# Patient Record
Sex: FEMALE | Race: ASIAN | NOT HISPANIC OR LATINO | Employment: FULL TIME | URBAN - METROPOLITAN AREA
[De-identification: names, ages, dates, MRNs, and addresses within clinical notes are randomized per-mention and may not be internally consistent; named-entity substitution may affect disease eponyms.]

---

## 2017-01-06 ENCOUNTER — OFFICE VISIT - HEALTHEAST (OUTPATIENT)
Dept: FAMILY MEDICINE | Facility: CLINIC | Age: 27
End: 2017-01-06

## 2017-01-06 DIAGNOSIS — Z63.72 SPOUSE OF ALCOHOLIC: ICD-10-CM

## 2017-01-06 DIAGNOSIS — Z30.017 NEXPLANON INSERTION: ICD-10-CM

## 2017-01-06 ASSESSMENT — MIFFLIN-ST. JEOR: SCORE: 1301.03

## 2017-07-26 ENCOUNTER — COMMUNICATION - HEALTHEAST (OUTPATIENT)
Dept: SCHEDULING | Facility: CLINIC | Age: 27
End: 2017-07-26

## 2017-07-26 ENCOUNTER — COMMUNICATION - HEALTHEAST (OUTPATIENT)
Dept: FAMILY MEDICINE | Facility: CLINIC | Age: 27
End: 2017-07-26

## 2017-07-27 ENCOUNTER — OFFICE VISIT - HEALTHEAST (OUTPATIENT)
Dept: FAMILY MEDICINE | Facility: CLINIC | Age: 27
End: 2017-07-27

## 2017-07-27 DIAGNOSIS — B37.31 YEAST VAGINITIS: ICD-10-CM

## 2017-07-27 DIAGNOSIS — R30.0 DYSURIA: ICD-10-CM

## 2017-07-27 DIAGNOSIS — R21 SKIN RASH: ICD-10-CM

## 2017-07-27 DIAGNOSIS — N30.90 CYSTITIS: ICD-10-CM

## 2017-08-23 ENCOUNTER — AMBULATORY - HEALTHEAST (OUTPATIENT)
Dept: NURSING | Facility: CLINIC | Age: 27
End: 2017-08-23

## 2017-12-11 ENCOUNTER — OFFICE VISIT - HEALTHEAST (OUTPATIENT)
Dept: FAMILY MEDICINE | Facility: CLINIC | Age: 27
End: 2017-12-11

## 2017-12-11 DIAGNOSIS — Z23 NEED FOR INFLUENZA VACCINATION: ICD-10-CM

## 2017-12-11 DIAGNOSIS — Z11.1 TUBERCULOSIS SCREENING: ICD-10-CM

## 2017-12-11 DIAGNOSIS — D64.9 ANEMIA: ICD-10-CM

## 2017-12-11 ASSESSMENT — MIFFLIN-ST. JEOR: SCORE: 1286.28

## 2017-12-13 ENCOUNTER — COMMUNICATION - HEALTHEAST (OUTPATIENT)
Dept: FAMILY MEDICINE | Facility: CLINIC | Age: 27
End: 2017-12-13

## 2020-01-22 ENCOUNTER — OFFICE VISIT - HEALTHEAST (OUTPATIENT)
Dept: FAMILY MEDICINE | Facility: CLINIC | Age: 30
End: 2020-01-22

## 2020-01-22 DIAGNOSIS — Z23 NEED FOR IMMUNIZATION AGAINST INFLUENZA: ICD-10-CM

## 2020-01-22 DIAGNOSIS — Z30.46 NEXPLANON REMOVAL: ICD-10-CM

## 2020-01-22 DIAGNOSIS — Z30.017 NEXPLANON INSERTION: ICD-10-CM

## 2020-01-22 LAB — HCG UR QL: NEGATIVE

## 2020-01-22 ASSESSMENT — MIFFLIN-ST. JEOR: SCORE: 1282.88

## 2020-11-05 ENCOUNTER — OFFICE VISIT - HEALTHEAST (OUTPATIENT)
Dept: FAMILY MEDICINE | Facility: CLINIC | Age: 30
End: 2020-11-05

## 2020-11-05 ENCOUNTER — COMMUNICATION - HEALTHEAST (OUTPATIENT)
Dept: FAMILY MEDICINE | Facility: CLINIC | Age: 30
End: 2020-11-05

## 2020-11-05 DIAGNOSIS — Z30.016 ENCOUNTER FOR INITIAL PRESCRIPTION OF TRANSDERMAL PATCH HORMONAL CONTRACEPTIVE DEVICE: ICD-10-CM

## 2020-11-05 DIAGNOSIS — Z31.69 ENCOUNTER FOR PRECONCEPTION CONSULTATION: ICD-10-CM

## 2020-11-05 DIAGNOSIS — Z30.46 NEXPLANON REMOVAL: ICD-10-CM

## 2020-11-05 ASSESSMENT — MIFFLIN-ST. JEOR: SCORE: 1325.12

## 2021-01-12 ENCOUNTER — AMBULATORY - HEALTHEAST (OUTPATIENT)
Dept: OTHER | Facility: CLINIC | Age: 31
End: 2021-01-12

## 2021-03-29 ENCOUNTER — OFFICE VISIT - HEALTHEAST (OUTPATIENT)
Dept: FAMILY MEDICINE | Facility: CLINIC | Age: 31
End: 2021-03-29

## 2021-03-29 DIAGNOSIS — Z11.1 SCREENING EXAMINATION FOR PULMONARY TUBERCULOSIS: ICD-10-CM

## 2021-03-29 DIAGNOSIS — Z30.9 CONTRACEPTIVE MANAGEMENT: ICD-10-CM

## 2021-03-29 LAB — HCG UR QL: NEGATIVE

## 2021-03-29 ASSESSMENT — MIFFLIN-ST. JEOR: SCORE: 1346.38

## 2021-03-31 LAB
GAMMA INTERFERON BACKGROUND BLD IA-ACNC: 0.08 IU/ML
M TB IFN-G BLD-IMP: NEGATIVE
MITOGEN IGNF BCKGRD COR BLD-ACNC: -0.01 IU/ML
MITOGEN IGNF BCKGRD COR BLD-ACNC: 0 IU/ML
QTF INTERPRETATION: NORMAL
QTF MITOGEN - NIL: 3.43 IU/ML

## 2021-04-20 ENCOUNTER — COMMUNICATION - HEALTHEAST (OUTPATIENT)
Dept: FAMILY MEDICINE | Facility: CLINIC | Age: 31
End: 2021-04-20

## 2021-04-29 ENCOUNTER — RECORDS - HEALTHEAST (OUTPATIENT)
Dept: ADMINISTRATIVE | Facility: OTHER | Age: 31
End: 2021-04-29

## 2021-04-29 ENCOUNTER — AMBULATORY - HEALTHEAST (OUTPATIENT)
Dept: FAMILY MEDICINE | Facility: CLINIC | Age: 31
End: 2021-04-29

## 2021-04-29 DIAGNOSIS — Z30.017 ENCOUNTER FOR INITIAL PRESCRIPTION OF IMPLANTABLE SUBDERMAL CONTRACEPTIVE: ICD-10-CM

## 2021-04-29 DIAGNOSIS — Z12.4 SCREENING FOR CERVICAL CANCER: ICD-10-CM

## 2021-04-29 DIAGNOSIS — Z11.3 SCREEN FOR STD (SEXUALLY TRANSMITTED DISEASE): ICD-10-CM

## 2021-04-29 LAB — HCG UR QL: NEGATIVE

## 2021-04-29 RX ORDER — ETONOGESTREL 68 MG/1
1 IMPLANT SUBCUTANEOUS ONCE
Status: ON HOLD | COMMUNITY
Start: 2021-04-29 | End: 2022-01-25

## 2021-04-29 RX ORDER — ACETAMINOPHEN 325 MG/1
325 TABLET ORAL EVERY 6 HOURS PRN
Qty: 120 TABLET | Refills: 12 | Status: ON HOLD | OUTPATIENT
Start: 2021-04-29 | End: 2022-01-25

## 2021-04-30 LAB
C TRACH DNA SPEC QL PROBE+SIG AMP: NEGATIVE
CLUE CELLS: NORMAL
HPV SOURCE: NORMAL
HUMAN PAPILLOMA VIRUS 16 DNA: NEGATIVE
HUMAN PAPILLOMA VIRUS 18 DNA: NEGATIVE
HUMAN PAPILLOMA VIRUS FINAL DIAGNOSIS: NORMAL
HUMAN PAPILLOMA VIRUS OTHER HR: NEGATIVE
N GONORRHOEA DNA SPEC QL NAA+PROBE: NEGATIVE
SPECIMEN DESCRIPTION: NORMAL
TRICHOMONAS, WET PREP: NORMAL
YEAST, WET PREP: NORMAL

## 2021-05-04 ENCOUNTER — COMMUNICATION - HEALTHEAST (OUTPATIENT)
Dept: FAMILY MEDICINE | Facility: CLINIC | Age: 31
End: 2021-05-04

## 2021-05-06 LAB
BKR LAB AP ABNORMAL BLEEDING: NO
BKR LAB AP BIRTH CONTROL/HORMONES: NORMAL
BKR LAB AP CERVICAL APPEARANCE: NORMAL
BKR LAB AP GYN ADEQUACY: NORMAL
BKR LAB AP GYN INTERPRETATION: NORMAL
BKR LAB AP HPV REFLEX: NORMAL
BKR LAB AP LMP: NORMAL
BKR LAB AP PATIENT STATUS: NORMAL
BKR LAB AP PREVIOUS ABNORMAL: NORMAL
BKR LAB AP PREVIOUS NORMAL: 2016
HIGH RISK?: NO
PATH REPORT.COMMENTS IMP SPEC: NORMAL
RESULT FLAG (HE HISTORICAL CONVERSION): NORMAL

## 2021-05-10 ENCOUNTER — COMMUNICATION - HEALTHEAST (OUTPATIENT)
Dept: FAMILY MEDICINE | Facility: CLINIC | Age: 31
End: 2021-05-10

## 2021-05-30 VITALS — HEIGHT: 59 IN | BODY MASS INDEX: 30.24 KG/M2 | WEIGHT: 150 LBS

## 2021-05-31 VITALS — BODY MASS INDEX: 29.58 KG/M2 | WEIGHT: 146.75 LBS | HEIGHT: 59 IN

## 2021-05-31 VITALS — BODY MASS INDEX: 29.89 KG/M2 | WEIGHT: 148 LBS

## 2021-06-04 VITALS
OXYGEN SATURATION: 93 % | SYSTOLIC BLOOD PRESSURE: 100 MMHG | BODY MASS INDEX: 31.31 KG/M2 | HEIGHT: 59 IN | WEIGHT: 155.31 LBS | TEMPERATURE: 98.8 F | RESPIRATION RATE: 24 BRPM | DIASTOLIC BLOOD PRESSURE: 80 MMHG | HEART RATE: 127 BPM

## 2021-06-04 VITALS
SYSTOLIC BLOOD PRESSURE: 112 MMHG | TEMPERATURE: 99.6 F | OXYGEN SATURATION: 98 % | HEIGHT: 59 IN | BODY MASS INDEX: 29.43 KG/M2 | DIASTOLIC BLOOD PRESSURE: 70 MMHG | HEART RATE: 98 BPM | WEIGHT: 146 LBS

## 2021-06-05 VITALS
OXYGEN SATURATION: 99 % | TEMPERATURE: 98.4 F | SYSTOLIC BLOOD PRESSURE: 124 MMHG | HEART RATE: 87 BPM | RESPIRATION RATE: 16 BRPM | WEIGHT: 151.5 LBS | DIASTOLIC BLOOD PRESSURE: 82 MMHG | BODY MASS INDEX: 28.63 KG/M2

## 2021-06-05 VITALS
HEART RATE: 117 BPM | BODY MASS INDEX: 28.89 KG/M2 | OXYGEN SATURATION: 97 % | DIASTOLIC BLOOD PRESSURE: 78 MMHG | HEIGHT: 61 IN | TEMPERATURE: 98.3 F | RESPIRATION RATE: 16 BRPM | WEIGHT: 153 LBS | SYSTOLIC BLOOD PRESSURE: 120 MMHG

## 2021-06-05 NOTE — PROGRESS NOTES
Formerly [facility]  DOS: 01/22/2020  Provider: Sarina Cartwright MD   used: Fran       SUBJECTIVE:    HPI:    30 y.o. female presenting today for Nexplanon removal and re-insertion.     She is right hand dominant.  She is not breastfeeding.  Current contraception: nexplanon  Last sexual activity: 1 month ago  Nexplanon previously placed 1/6/17    PMH:  No known contraindications to Nexplanon      No current or past history of thrombosis or thromboembolic disorders       No hepatic tumors or active liver disease       No undiagnosed abnormal genital bleeding       No known or suspected carcinoma of the breast or personal history of breast cancer       No hypersensitivity to any of the components of NEXPLANON      No known history of keloids    ROS: 10 point review of symptoms otherwise negative except as detailed in HPI.     OBJECTIVE:    LABS: UPT NEGATIVE TODAY- checked due to nexplanon being over-due for removal, placed over 3 years ago. 1/6/17.       ASSESSMENT:   We reviewed the Nexplanon literature and counseling re full range of contraceptive options. She denies any contraindications to its use. We discussed that her bleeding profile will change. She is aware of 3 year effectiveness of this method.  She is aware of potential side effects including infection at site, intermenstrual bleeding, irregular bleeding, amenorrhea, need for minor surgical procedure to remove or more extensive if implant is deep    Appropriate candidate for Nexplanon removal and reinsertion    PLAN & PROCEDURE NOTE:  She elected to proceed with the removal and replacement after the risks and benefits were discussed. Denies allergy to local anesthesia, keloid formation.     Consent signed and will be scanned in EMR.     PROCEDURE NOTE: Nexplanon Removal and Reinsertion.    After cleansing left arm above the elbow, 1 mL of 1% Lidocaine was administered along the planned injection site for Nexplanon. Insertion site cleansed  with iodine. 0.25 cm incision was made at tip of Nexplanon with 11 blade scalpel. The tip was visualized and was grasped with pickups. Nexplanon was easily removed.     Nexplanon was then inserted directly into the previous incision. Palpation revealed subdermal placement; the patient was able to feel implant as well.     Bleeding was minimal and a pressure dressing was applied with instructions to leave this in place for 24 hours. Pt was instructed to observe for signs/symptoms of any infection (localized tenderness, pain, inflammation) or excessive bruising.  No apparent distress at completion of procedure. No complications.     Established patient Nexplanon removal and re-insertion    Sarina Cartwright MD
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
## 2021-06-08 NOTE — PROGRESS NOTES
nexplanon insertion    Pre-operative Diagnosis: desires contraception    Post-operative Diagnosis: s/p nexplanon insertion    Indications: contraception    Procedure Details   Urine pregnancy test was done today and result was negative.  The risks (including infection, bleeding, pain, and difficulty with removal, and risk of tubal pregnancy) and benefits of the procedure were explained to the patient and Written informed consent was obtained.    Pt is right handed, so the left arm was prepped.  The area was numbed with lidocaine, and the nexplanon was placed in the usual fashion. A pressure bandage was placed.   Patient tolerated procedure well.  No complications.  No ebl.      nexplanon  Lot #Lot: [id number]  Exp: 02/2019  NDC: 4695-7946-45    Condition:  Stable    Complications:  None    Plan:    The patient was advised to call for any problems. She was advised to use OTC analgesics as needed for mild to moderate pain.   Tylenol sent in to the pharmacy.

## 2021-06-12 NOTE — TELEPHONE ENCOUNTER
Medication Question or Clarification  Who is calling: Pharmacy   What medication are you calling about (include dose and sig)?:    Disp Refills Start End    norelgestromin-ethinyl estradiol (ORTHO EVRA) 150-35 mcg/24 hr 4 patch 11 11/5/2020     Sig - Route: Place 1 patch on the skin every 7 days. - Transdermal    Sent to pharmacy as: norelgestromin 150 mcg-e.estradioL 35 mcg/24 hr weekly transderm patch (ORTHO EVRA)    E-Prescribing Status: Receipt confirmed by pharmacy (11/5/2020  2:40 PM CST)      Who prescribed the medication? Sarina Cartwright MD  What is your question/concern?: Caller is questioning if the patient is supposed to do all 4 weeks.  Requested Pharmacy: [id number]  Okay to leave a detailed message?: No

## 2021-06-12 NOTE — PROGRESS NOTES
Mom came in with her baby Clint today. We resolved his billing issue. She was interested in classes because she said she is bored and lonely at home.  SW found a couple [employer] classes for Stephanie speaking families that also had a family literacy class, and referred her to [employer] for ESL classes. She is familiar with them because of their clothing give aways, so she likes that idea too.  WE completed the [employer]CF[employer] application and mailed it.

## 2021-06-12 NOTE — PROGRESS NOTES
ASSESSMENT AND PLAN:  1. Skin rash she has a very isolated rash on the right hand measuring 1 x 1 cm this is specific non-irritated lesion it is possible she may have cold air to cardiac we will continue monitoring symptoms currently she is using a Asian antihistamine.    2. Dysuria complains of burning when urinating for the last 2-3 days with some vaginal discharge denies any blood in urine denies feeling feverish Urinalysis-UC if Indicated    Culture, Urine   3. Cystitis confirmed by urinalysis start ciprofloxacin empirically for 10 days    4. Yeast vaginitis UA showed evidence of yeast, Diflucan started side effects discussed if symptoms persist for 2 weeks after treatment she needs to be seen for a pelvic exam        CHIEF COMPLAINT:  Chief Complaint   Patient presents with     other     possible rash, whole body and having headaches       HISTORY OF PRESENT ILLNESS:  Ninoska is a 27 y.o. female presenting with a possible rash. Ninoska is present with a [employer] (acquired by [employer]) . She states that she has had a rash for the past week. She has been taking allergy medication from [facility]. She notes that the rash has improved. When the rash worsens it is all over her body and she has difficulty breathing. She denies having similar symptoms in the past.        REVIEW OF SYSTEMS:   She has had vaginal discharge for the past 2 days. She notes that she has a burning sensation when she urinates.    All other 10 point review of systems are negative.    PFSH:  Reviewed as below.     TOBACCO USE:  History   Smoking Status     Passive Smoke Exposure - Never Smoker   Smokeless Tobacco     Current User     Types: Chew     Comment:  outside       VITALS:  Vitals:    07/27/17 1109   BP: 112/70   Patient Site: Right Arm   Patient Position: Sitting   Cuff Size: Adult Regular   Pulse: (!) 108   Resp: 24   Weight: 148 lb (67.1 kg)     Wt Readings from Last 3 Encounters:   07/27/17 148 lb (67.1 kg)   01/06/17 150 lb (68 kg)   10/04/16 137 lb  (62.1 kg)     Body mass index is 29.89 kg/(m^2).    PHYSICAL EXAM:  General: Alert, cooperative, no distress, appears stated age  Lungs: Clear to auscultation bilaterally, respirations unlabored  Chest wall: No tenderness or deformity  Heart: Regular rate and rhythm, S1 and S2 normal, no murmur, rub, or gallop  Skin: Circular flat erythematous lesion with blanching over left hand.   Neurologic:  A & O x 3.  No tremor, no focal findings.       DATA REVIEWED:  Additional History from Old Records Summarized (2): Reviewed Dr. Walker's note from 1/6/2017 regarding contraception.   Decision to Obtain Records (1): None  Radiology Tests Summarized or Ordered (1): None  Labs Reviewed or Ordered (1): Labs ordered.  Medicine Test Summarized or Ordered (1): None  Independent Review of EKG or X-RAY(2 each): None    The visit lasted a total of 25 minutes face to face with the patient. Over 50% of the time was spent counseling and educating the patient about rash.     I, Sameera Cruz, am scribing for and in the presence of, Dr. Fields.    ICong, personally performed the services described in this documentation, as scribed by Sameera Cruz in my presence, and it is both accurate and complete.      MEDICATIONS:  Current Outpatient Prescriptions   Medication Sig Dispense Refill     acetaminophen (TYLENOL) 325 MG tablet Take 1 tablet (325 mg total) by mouth every 6 (six) hours as needed for pain. 120 tablet 12     etonogestrel (NEXPLANON) 68 mg Impl implant 1 each by Subdermal route once. Lot: [id number]  Exp: 02/2019  NDC: 6770-9675-30       prenatal multivit-Ca-min-Fe-FA (PRENATAL VITAMIN) Tab Take 1 tablet by mouth daily. 90 each 3     sodium chloride (OCEAN) 0.65 % nasal spray 2 sprays into each nostril as needed for congestion. 60 mL 3     Current Facility-Administered Medications   Medication Dose Route Frequency Provider Last Rate Last Dose     medroxyPROGESTERone injection 150 mg (DEPO-PROVERA)  150 mg  Intramuscular Q3 Months Dora Walker MD   150 mg at 10/04/16 1155       Total Data Points: 3

## 2021-06-12 NOTE — PROGRESS NOTES
"SUBJECTIVE  Aeh She Deonte is a 30 y.o. female here for:    Has 3 children  Desires 1 more  Had nexplanon placed 1/6/17 and removed and reinserted 1/22/20  She desires removal today and wants to use patch  She has been having dizziness \"all the time\" and thinks this is because she does not get period.  Worried the \"blood builds up and goes to her head and makes her dizzy\"  She wants to be pregnant but not right away  Wants to use patch for a few months so she can get her period regularly and then start trying to get pregnant  Her  wants another child- she reports that she feels ready  She denies any stressors or safety issues.    Due for pap smear- declined today.    ROS  Complete 10 point review of systems negative except as noted above in HPI    Reviewed Past Medical History, Medications, Family History and Social History in Epic and up to date with no new changes.    OBJECTIVE  /80   Pulse (!) 127   Temp 98.8  F (37.1  C) (Oral)   Resp 24   Ht 4' 11\" (1.499 m)   Wt 155 lb 5 oz (70.4 kg)   SpO2 93%   BMI 31.37 kg/m       General: Cooperative, pleasant, in no acute distress  Ext: Left arm with nexplanon palpated    PROCEDURE NOTE: Nexplanon Removal    After cleansing left arm above the elbow, 2 mL of 1% Lidocaine was administered along the distal edge of nexplanon that was palpated. Insertion site cleansed with iodine. 0.25 cm incision was made at tip of Nexplanon with 11 blade scalpel. The tip was visualized and was grasped with pickups. Nexplanon was easily removed.     Bleeding was minimal and a pressure dressing was applied with instructions to leave this in place for 24 hours. Pt was instructed to observe for signs/symptoms of any infection (localized tenderness, pain, inflammation) or excessive bruising.  No apparent distress at completion of procedure. No complications.      ASSESSMENT/PLAN:   Ninoska was seen today for contraception and contraception.    Diagnoses and all orders for this " visit:      Encounter for preconception consultation: Discussed starting prenatal vitamin- see below for more details regarding her fertility goals.  -     prenatal vit no.130-iron-folic (PRENATAL VITAMIN) 27 mg iron- 800 mcg Tab tablet; Take 1 tablet by mouth daily.    Nexplanon removal/Encounter for initial prescription of transdermal patch hormonal contraceptive device: She desires fertility but not immediately- she was concerned about possible side effects of dizziness and amenorrhea from nexplanon. I extensively counseled on this- and reassured patient that if she does want to get pregnant in a few months, she could keep nexplanon in and then remove when she is ready, as once the nexplanon is removed, she could get pregnant immediately. However, she is adamant about removing today. Nexplanon removal completed without complication- see procedure note above. Discussed after-cares. Discussed contraceptive options- she does desire fertility but not for a few months and she will plan to use patch- counseled on how to use.  -     norelgestromin-ethinyl estradiol (ORTHO EVRA) 150-35 mcg/24 hr; Place 1 patch on the skin every 7 days.      Visit was completed along with Stephanie dasilva    Options for treatment and follow-up care were reviewed with the patient. Aeh She Deonte and/or guardian was engaged and actively involved in the decision making process. Ae She Deonte and/or guardian verbalized understanding of the options discussed and was satisfied with the final plan.      Sarina Cartwright MD

## 2021-06-14 NOTE — PROGRESS NOTES
"Chief Complaint   Patient presents with     PPD Placement     for work [facility] FAX: [phone]        HPI:  Ninoska Clemons is a 27 y.o. female with   requesting screening for TB for work.  [place].  Had negative quantiferon in 2014    Also noted had anemia after last delivery in 2016.    History of TB:  none  History of BCG: none  Weight loss:  none  Cough: none  Night Sweats: none    ROS:  A 12 point comprehensive review of systems was negative except as noted.     EXAM:  Vitals:    12/11/17 1323   BP: 90/58   Patient Site: Left Arm   Patient Position: Sitting   Cuff Size: Adult Regular   Pulse: 80   Resp: 16   Temp: 98.6  F (37  C)   TempSrc: Oral   Weight: 146 lb 12 oz (66.6 kg)   Height: 4' 11\" (1.499 m)       GEN:  NAD  LUNGS:  Clear to auscultation without wheezing.  Normal effort.  HEART:  RRR without murmur, rub or gallop       A/P:      1. Tuberculosis screening  QTF-Mycobacterium tuberculosis by QuantiFERON-TB Gold   2. Anemia  Hemoglobin        Recheck as needed.    Thomas Martins MD    12/11/2017  "

## 2021-06-16 NOTE — TELEPHONE ENCOUNTER
Quality call left message [facility] clinic and someone would assist her to  schedule a physical and pap brittney.

## 2021-06-16 NOTE — PROGRESS NOTES
"    Assessment & Plan     Ninoska was seen today for matoux test and contraception.    Diagnoses and all orders for this visit:    Screening examination for pulmonary tuberculosis  -     QTF-Mycobacterium tuberculosis by QuantiFERON-TB Gold Plus    Contraceptive management  -     Pregnancy (Beta-hCG, Qual), Urine  -     norelgestromin-ethinyl estradiol (ORTHO EVRA) 150-35 mcg/24 hr; Place 1 patch on the skin every 7 days.                 BMI:   Estimated body mass index is 28.91 kg/m  as calculated from the following:    Height as of this encounter: 5' 1\" (1.549 m).    Weight as of this encounter: 153 lb (69.4 kg).       Return in about 1 year (around 3/29/2022) for Annual physical.    Misha Adams MD  [facility] Xiao She Deonte is 31 y.o. and presents today for the following health issues   HPI     Quantiferon was negative in 2017.  No exposure to TB.    Did not get Patch in November.    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        Current Outpatient Medications   Medication Sig Dispense Refill     acetaminophen (TYLENOL) 325 MG tablet Take 1 tablet (325 mg total) by mouth every 6 (six) hours as needed for pain. 120 tablet 12     norelgestromin-ethinyl estradiol (ORTHO EVRA) 150-35 mcg/24 hr Place 1 patch on the skin every 7 days. 3 patch 11     prenatal vit no.130-iron-folic (PRENATAL VITAMIN) 27 mg iron- 800 mcg Tab tablet Take 1 tablet by mouth daily. 90 tablet 3     No current facility-administered medications for this visit.          Review of Systems  No cough, fever, chills, weight loss, sweats.      Objective    /78 (Patient Site: Left Arm, Patient Position: Sitting, Cuff Size: Adult Regular)   Pulse (!) 117   Temp 98.3  F (36.8  C) (Oral)   Resp 16   Ht 5' 1\" (1.549 m)   Wt 153 lb (69.4 kg)   LMP  (LMP Unknown)   SpO2 97%   BMI 28.91 kg/m    Body mass index is 28.91 kg/m .  Physical Exam  Heart normal  Lungs normal              "

## 2021-06-17 NOTE — TELEPHONE ENCOUNTER
----- Message from Dora Walker MD sent at 5/4/2021  5:18 AM CDT -----  Please call pt with an  and let them know that their lab results dont' show any vaginal infections

## 2021-06-17 NOTE — TELEPHONE ENCOUNTER
Provider response below relayed to caller. Patient verbalizes understanding of provider test result message and recommended follow up/plan of care.
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
## 2021-06-21 NOTE — LETTER
Letter by Sarina Agee RN at      Author: Sarina Agee RN Service: -- Author Type: --    Filed:  Encounter Date: 5/10/2021 Status: (Other)         Ninoska Sanchez Deonte  [address]  [place] [address]             May 10, 2021         Dear Ms. Clemons,    We are happy to inform you that your recent Pap smear and Human Papillomavirus (HPV) test results are normal and negative.    It is recommended that you have your next Pap smear and Human Papillomavirus (HPV) test in 3 years. You will also need to return to the clinic every year for an annual wellness visit.    If you have additional questions regarding this result, please contact our office and we will be happy to assist you.      Sincerely,    Your [facility] Care Team

## 2021-07-03 NOTE — ADDENDUM NOTE
Addendum Note by Claudia Manning MA at 1/22/2020  3:20 PM     Author: Claudia Manning MA Service: -- Author Type: Medical Assistant    Filed: 1/22/2020  6:29 PM Encounter Date: 1/22/2020 Status: Signed    : Claudia Manning MA (Medical Assistant)    Addended by: CLAUDIA MANNING on: 1/22/2020 06:29 PM        Modules accepted: Orders

## 2021-07-04 NOTE — ADDENDUM NOTE
Addendum Note by Dora Walker MD at 4/29/2021 10:45 AM     Author: Dora Walker MD Service: -- Author Type: Physician    Filed: 4/30/2021  9:05 AM Encounter Date: 4/29/2021 Status: Signed    : Dora Walker MD (Physician)    Addended by: DORA WALKER on: 4/30/2021 09:05 AM        Modules accepted: Orders

## 2021-09-15 ENCOUNTER — NURSE TRIAGE (OUTPATIENT)
Dept: FAMILY MEDICINE | Facility: CLINIC | Age: 31
End: 2021-09-15

## 2021-09-15 NOTE — TELEPHONE ENCOUNTER
PATIENT walked in to clinic last week with concerns she may be pregnant, she was going to ago go to walk-in clinic to be seen same day. Due to language barrier, home test from pharmacy may not accessible.     - No visit recorded in-chart, RN called patient to follow-up and inquire if she would like to come in for RN appointment(s) for pregnancy test.     Attempted call twice via  [phone], call does not go through/ring.     Rosalba Serna RN on 9/15/2021 at 12:04 PM

## 2021-09-20 ENCOUNTER — OFFICE VISIT (OUTPATIENT)
Dept: FAMILY MEDICINE | Facility: CLINIC | Age: 31
End: 2021-09-20
Payer: COMMERCIAL

## 2021-09-20 ENCOUNTER — HOSPITAL ENCOUNTER (OUTPATIENT)
Dept: ULTRASOUND IMAGING | Facility: HOSPITAL | Age: 31
Discharge: HOME | End: 2021-09-20
Attending: FAMILY MEDICINE | Admitting: FAMILY MEDICINE
Payer: COMMERCIAL

## 2021-09-20 VITALS
DIASTOLIC BLOOD PRESSURE: 69 MMHG | WEIGHT: 149.7 LBS | OXYGEN SATURATION: 99 % | TEMPERATURE: 97.9 F | BODY MASS INDEX: 28.29 KG/M2 | SYSTOLIC BLOOD PRESSURE: 104 MMHG | RESPIRATION RATE: 16 BRPM | HEART RATE: 84 BPM

## 2021-09-20 VITALS
HEART RATE: 73 BPM | BODY MASS INDEX: 28.53 KG/M2 | DIASTOLIC BLOOD PRESSURE: 80 MMHG | TEMPERATURE: 98.2 F | WEIGHT: 151 LBS | SYSTOLIC BLOOD PRESSURE: 120 MMHG | OXYGEN SATURATION: 99 %

## 2021-09-20 DIAGNOSIS — Z64.0 UNWANTED PREGNANCY WITH PLANS FOR TERMINATION: ICD-10-CM

## 2021-09-20 DIAGNOSIS — Z64.0 UNWANTED PREGNANCY WITH PLANS FOR TERMINATION: Primary | ICD-10-CM

## 2021-09-20 DIAGNOSIS — N92.6 MISSED MENSES: Primary | ICD-10-CM

## 2021-09-20 DIAGNOSIS — Z97.5 NEXPLANON IN PLACE: ICD-10-CM

## 2021-09-20 DIAGNOSIS — Z33.1 PREGNANT STATE, INCIDENTAL: ICD-10-CM

## 2021-09-20 LAB — HCG UR QL: POSITIVE

## 2021-09-20 PROCEDURE — 76805 OB US >/= 14 WKS SNGL FETUS: CPT

## 2021-09-20 PROCEDURE — 99214 OFFICE O/P EST MOD 30 MIN: CPT | Mod: 25 | Performed by: STUDENT IN AN ORGANIZED HEALTH CARE EDUCATION/TRAINING PROGRAM

## 2021-09-20 PROCEDURE — 81025 URINE PREGNANCY TEST: CPT | Performed by: STUDENT IN AN ORGANIZED HEALTH CARE EDUCATION/TRAINING PROGRAM

## 2021-09-20 PROCEDURE — 99213 OFFICE O/P EST LOW 20 MIN: CPT | Performed by: FAMILY MEDICINE

## 2021-09-20 NOTE — PROGRESS NOTES
ASSESSMENT/PLAN:   Ninoska was seen today for probelm with nexplanon.    Diagnoses and all orders for this visit:    Unwanted pregnancy with plans for termination  -     Cancel: US OB < 14 Weeks Single; Future  -     Other Specialty Referral; Future  -     US OB < 14 Weeks Single; Future    Timing of the pregnancy is uncertain, though she does say that she has been feeling movement for the past week or 2.  I did let her know that past a certain gestational age, termination is not feasible within [place].  Will obtain an ultrasound urgently to see how far along she is.  [facility] and we did work to set her up within the next week for an appointment there.  I did not remove the next line today but will have her come back on Thursday to remove this and review what information and appointments we have available.      No follow-ups on file.       ======================================================    SUBJECTIVE  Ninoska Clemons is a 31 year old female her  1.  UNPLANNED PREGNANCY:  She found out today that she is pregnancy.  She had a nexplanon placed in 4/29/21 with a negative pregnancy test.    She has not had any menses since she got her nexplanon placed, which is not what she experienced in the past.    She is feeling some movement for the last 1 to 2 weeks.  She is not desirous of having another baby and is very distressed by this news      ROS  Complete 10 point review of systems negative except as noted above in HPI      OBJECTIVE  /80 (BP Location: Right arm, Patient Position: Sitting, Cuff Size: Adult Regular)   Pulse 73   Temp 98.2  F (36.8  C) (Oral)   Wt 68.5 kg (151 lb)   SpO2 99%   BMI 28.53 kg/m     Gen:  Anxious and sad.    Fundus palpable on exam to the umbilicus.  Fetus noted on bedside US  Fht:  140's.    Current Outpatient Medications   Medication     etonogestreL (NEXPLANON) 68 mg Impl implant     acetaminophen (TYLENOL) 325 MG tablet     No current  facility-administered medications for this visit.      Patient Active Problem List   Diagnosis     ASCUS of cervix with negative high risk HPV        LABS & IMAGES   Results for orders placed or performed in visit on 09/20/21   HCG qualitative urine     Status: Abnormal   Result Value Ref Range    hCG Urine Qualitative Positive (A) Negative         ======================================================    MDM          Options for treatment and follow-up care were reviewed with the patient. Ae She Deonte and/or guardian was engaged and actively involved in the decision making process. [facility] She Deonte and/or guardian verbalized understanding of the options discussed and was satisfied with the final plan.      Dora Walker MD

## 2021-09-20 NOTE — PROGRESS NOTES
Patient presents with:  Pregnancy Test: currently has nexplanon, feeling nausea, not getting period at all       Clinical Decision Making:      ICD-10-CM    1. Missed menses  N92.6 HCG qualitative urine     HCG qualitative urine     Ob/Gyn Referral     CANCELED: Ob/Gyn Referral   2. Pregnant state, incidental  Z33.1 Ob/Gyn Referral     CANCELED: Ob/Gyn Referral   3. Nexplanon in place  Z97.5 Ob/Gyn Referral     CANCELED: Ob/Gyn Referral     Failure of nexplanon with pregnancy now desiring elective . Referred to OB/GYN. I recommended PCP follow-up as well in order to discuss the distress she is feeling and provide support during the  process.     HPI:  Ninoska Clemons is a 31 year old female who presents today complaining of breast tenderness, and mild nausea. She is worried she is pregnant. Nexplanon was placed 2020 and she has not had a period since then. She has had symptoms for a few days. If the test is positive she would like an elective .     History obtained from the patient.    Problem List:  2016: Pregnant  2015: ASCUS of cervix with negative high risk HPV      Past Medical History:   Diagnosis Date     Abnormal Pap smear of cervix     see problem list     Chlamydia       (normal spontaneous vaginal delivery)       [place]     Urinary tract infection        Social History     Tobacco Use     Smoking status: Passive Smoke Exposure - Never Smoker     Smokeless tobacco: Current User     Types: Chew     Tobacco comment:  outside- Pt chews tobacco   Substance Use Topics     Alcohol use: No       Vitals:    21 1027   BP: 104/69   BP Location: Right arm   Patient Position: Sitting   Cuff Size: Adult Regular   Pulse: 84   Resp: 16   Temp: 97.9  F (36.6  C)   TempSrc: Oral   SpO2: 99%   Weight: 67.9 kg (149 lb 11.2 oz)       Physical Exam  Constitutional:       Appearance: Normal appearance.   HENT:      Head: Normocephalic.      Mouth/Throat:       Mouth: Mucous membranes are moist.   Cardiovascular:      Rate and Rhythm: Normal rate.   Abdominal:      General: Abdomen is flat. There is no distension.      Palpations: Abdomen is soft.      Tenderness: There is no abdominal tenderness.   Neurological:      Mental Status: She is alert.         Labs:  Results for orders placed or performed in visit on 09/20/21   HCG qualitative urine     Status: Abnormal   Result Value Ref Range    hCG Urine Qualitative Positive (A) Negative           At the end of the encounter, I discussed results, diagnosis, medications. Discussed red flags for immediate return to clinic/ER, as well as indications for follow up if no improvement. Patient understood and agreed to plan. Patient was stable for discharge.

## 2021-09-23 ENCOUNTER — OFFICE VISIT (OUTPATIENT)
Dept: FAMILY MEDICINE | Facility: CLINIC | Age: 31
End: 2021-09-23
Payer: COMMERCIAL

## 2021-09-23 VITALS
HEART RATE: 118 BPM | BODY MASS INDEX: 28.72 KG/M2 | RESPIRATION RATE: 16 BRPM | TEMPERATURE: 99.1 F | WEIGHT: 152 LBS | SYSTOLIC BLOOD PRESSURE: 102 MMHG | DIASTOLIC BLOOD PRESSURE: 70 MMHG | OXYGEN SATURATION: 98 %

## 2021-09-23 DIAGNOSIS — Z64.0 UNWANTED PREGNANCY WITH PLANS FOR TERMINATION: Primary | ICD-10-CM

## 2021-09-23 DIAGNOSIS — Z3A.23 PREGNANCY WITH 23 COMPLETED WEEKS GESTATION: ICD-10-CM

## 2021-09-23 PROCEDURE — 99213 OFFICE O/P EST LOW 20 MIN: CPT | Performed by: FAMILY MEDICINE

## 2021-09-23 NOTE — PROGRESS NOTES
ASSESSMENT/PLAN:   Ninoska was seen today for nexplanon removal.    Diagnoses and all orders for this visit:    Unwanted pregnancy with plans for termination    Pregnancy with 23 completed weeks gestation    We did contact  clinics here in [place] and were unable to find any location where they could provide a termination up to 23 weeks.  The patient had to leave prior to us being able to obtain this information, therefore we did not remove her Nexplanon today.  I did review with her that if she is unable to obtain a termination she would have the option of adoption.  She kept indicating to me that she was going to get an  in October, I did let her know that it is unlikely that they would be able to complete an  at that time given her advanced gestational age.  I did ask her to return tomorrow once we found out that [facility] to [facility] at her current gestational age to have the Nexplanon removed tomorrow.  We will start a prenatal vitamin and draw prenatal labs at that time.  We will set her up to see our  for further support.  The patient indicated that she was safe but also was very hesitant to use any sort of  with whom she was familiar.  We will continue to monitor for safety.      No follow-ups on file.       ======================================================    SUBJECTIVE  Ninoska Clemons is a 31 year old female here for   1.  nexplanon removal:    This was placed on 21, her conception date based on her current due date was 21, so likely she was already in early pregnancy when the nexplanon went in.      This pregnancy is unplanned and unwanted.  She would like to seek a termination.   [facility], but it is likely they will not be able to do this there given her advanced gestational age.     ROS  Complete 10 point review of systems negative except as noted  above in HPI      OBJECTIVE  /70   Pulse 118   Temp 99.1  F (37.3  C) (Oral)   Resp 16   Wt 68.9 kg (152 lb)   SpO2 98%   BMI 28.72 kg/m     General: Desperate.    Current Outpatient Medications   Medication     acetaminophen (TYLENOL) 325 MG tablet     etonogestreL (NEXPLANON) 68 mg Impl implant     No current facility-administered medications for this visit.      Patient Active Problem List   Diagnosis     ASCUS of cervix with negative high risk HPV        LABS & IMAGES   No results found for any visits on 09/23/21.      ======================================================    MDM          Options for treatment and follow-up care were reviewed with the patient. Aeh S Deonte and/or guardian was engaged and actively involved in the decision making process. Aeh S Deonte and/or guardian verbalized understanding of the options discussed and was satisfied with the final plan.      Dora Walker MD

## 2021-09-24 ENCOUNTER — PATIENT OUTREACH (OUTPATIENT)
Dept: NURSING | Facility: CLINIC | Age: 31
End: 2021-09-24

## 2021-09-24 ENCOUNTER — OFFICE VISIT (OUTPATIENT)
Dept: FAMILY MEDICINE | Facility: CLINIC | Age: 31
End: 2021-09-24
Payer: COMMERCIAL

## 2021-09-24 VITALS
OXYGEN SATURATION: 98 % | SYSTOLIC BLOOD PRESSURE: 108 MMHG | TEMPERATURE: 99.3 F | HEART RATE: 110 BPM | BODY MASS INDEX: 28.72 KG/M2 | WEIGHT: 152 LBS | DIASTOLIC BLOOD PRESSURE: 60 MMHG | RESPIRATION RATE: 16 BRPM

## 2021-09-24 DIAGNOSIS — Z97.5 NEXPLANON IN PLACE: ICD-10-CM

## 2021-09-24 DIAGNOSIS — Z30.46 ENCOUNTER FOR NEXPLANON REMOVAL: ICD-10-CM

## 2021-09-24 DIAGNOSIS — Z3A.23 PREGNANCY WITH 23 COMPLETED WEEKS GESTATION: ICD-10-CM

## 2021-09-24 DIAGNOSIS — Z64.0 UNWANTED PREGNANCY WITH PLANS FOR TERMINATION: Primary | ICD-10-CM

## 2021-09-24 LAB
ABO/RH(D): NORMAL
ALBUMIN UR-MCNC: NEGATIVE MG/DL
ANTIBODY SCREEN: NEGATIVE
APPEARANCE UR: ABNORMAL
BACTERIA #/AREA URNS HPF: ABNORMAL /HPF
BASOPHILS # BLD AUTO: 0 10E3/UL (ref 0–0.2)
BASOPHILS NFR BLD AUTO: 0 %
BILIRUB UR QL STRIP: NEGATIVE
COLOR UR AUTO: YELLOW
EOSINOPHIL # BLD AUTO: 0.2 10E3/UL (ref 0–0.7)
EOSINOPHIL NFR BLD AUTO: 1 %
ERYTHROCYTE [DISTWIDTH] IN BLOOD BY AUTOMATED COUNT: 19 % (ref 10–15)
GLUCOSE UR STRIP-MCNC: NEGATIVE MG/DL
HCT VFR BLD AUTO: 33.9 % (ref 35–47)
HGB BLD-MCNC: 10.9 G/DL (ref 11.7–15.7)
HGB UR QL STRIP: NEGATIVE
HIV 1+2 AB+HIV1 P24 AG SERPL QL IA: NEGATIVE
IMM GRANULOCYTES # BLD: 0.2 10E3/UL
IMM GRANULOCYTES NFR BLD: 1 %
KETONES UR STRIP-MCNC: ABNORMAL MG/DL
LEUKOCYTE ESTERASE UR QL STRIP: NEGATIVE
LYMPHOCYTES # BLD AUTO: 2.6 10E3/UL (ref 0.8–5.3)
LYMPHOCYTES NFR BLD AUTO: 20 %
MCH RBC QN AUTO: 21.4 PG (ref 26.5–33)
MCHC RBC AUTO-ENTMCNC: 32.2 G/DL (ref 31.5–36.5)
MCV RBC AUTO: 67 FL (ref 78–100)
MONOCYTES # BLD AUTO: 0.7 10E3/UL (ref 0–1.3)
MONOCYTES NFR BLD AUTO: 5 %
NEUTROPHILS # BLD AUTO: 9.5 10E3/UL (ref 1.6–8.3)
NEUTROPHILS NFR BLD AUTO: 73 %
NITRATE UR QL: NEGATIVE
NRBC # BLD AUTO: 0 10E3/UL
NRBC BLD AUTO-RTO: 0 /100
PH UR STRIP: 6 [PH] (ref 5–7)
PLATELET # BLD AUTO: 305 10E3/UL (ref 150–450)
RBC # BLD AUTO: 5.1 10E6/UL (ref 3.8–5.2)
RBC #/AREA URNS AUTO: ABNORMAL /HPF
SP GR UR STRIP: 1.01 (ref 1–1.03)
SPECIMEN EXPIRATION DATE: NORMAL
SQUAMOUS #/AREA URNS AUTO: ABNORMAL /LPF
UROBILINOGEN UR STRIP-ACNC: 0.2 E.U./DL
WBC # BLD AUTO: 13.1 10E3/UL (ref 4–11)
WBC #/AREA URNS AUTO: ABNORMAL /HPF

## 2021-09-24 PROCEDURE — 81001 URINALYSIS AUTO W/SCOPE: CPT | Performed by: FAMILY MEDICINE

## 2021-09-24 PROCEDURE — 86803 HEPATITIS C AB TEST: CPT | Performed by: FAMILY MEDICINE

## 2021-09-24 PROCEDURE — 86780 TREPONEMA PALLIDUM: CPT | Performed by: FAMILY MEDICINE

## 2021-09-24 PROCEDURE — 86900 BLOOD TYPING SEROLOGIC ABO: CPT | Performed by: FAMILY MEDICINE

## 2021-09-24 PROCEDURE — 86901 BLOOD TYPING SEROLOGIC RH(D): CPT | Performed by: FAMILY MEDICINE

## 2021-09-24 PROCEDURE — 99000 SPECIMEN HANDLING OFFICE-LAB: CPT | Performed by: FAMILY MEDICINE

## 2021-09-24 PROCEDURE — 87086 URINE CULTURE/COLONY COUNT: CPT | Performed by: FAMILY MEDICINE

## 2021-09-24 PROCEDURE — 87389 HIV-1 AG W/HIV-1&-2 AB AG IA: CPT | Performed by: FAMILY MEDICINE

## 2021-09-24 PROCEDURE — 85025 COMPLETE CBC W/AUTO DIFF WBC: CPT | Performed by: FAMILY MEDICINE

## 2021-09-24 PROCEDURE — 99214 OFFICE O/P EST MOD 30 MIN: CPT | Performed by: FAMILY MEDICINE

## 2021-09-24 PROCEDURE — 83655 ASSAY OF LEAD: CPT | Mod: 90 | Performed by: FAMILY MEDICINE

## 2021-09-24 PROCEDURE — 36415 COLL VENOUS BLD VENIPUNCTURE: CPT | Performed by: FAMILY MEDICINE

## 2021-09-24 PROCEDURE — 87340 HEPATITIS B SURFACE AG IA: CPT | Performed by: FAMILY MEDICINE

## 2021-09-24 PROCEDURE — 86762 RUBELLA ANTIBODY: CPT | Performed by: FAMILY MEDICINE

## 2021-09-24 PROCEDURE — 86850 RBC ANTIBODY SCREEN: CPT | Performed by: FAMILY MEDICINE

## 2021-09-24 NOTE — PROGRESS NOTES
"ASSESSMENT/PLAN:   Ninoska was seen today for nexplanon removal.    Diagnoses and all orders for this visit:    Unwanted pregnancy with plans for termination    Nexplanon in place    Pregnancy with 23 completed weeks gestation  -     UA reflex to Microscopic  -     Urine Culture  -     Treponema Abs w Reflex to RPR and Titer  -     HIV Antigen Antibody Combo  -     Hepatitis B surface antigen  -     CBC with Platelets & Differential  -     Rubella Antibody IgG  -     Lead, Venous Blood Confirmation  -     Hepatitis C antibody  -     ABO/Rh type and screen; Future  -     ABO/Rh type and screen  -     Urine Microscopic Exam    pt says she does not want any help setting up any appointments in [place], that she will just \"make other arrangements\".  When I ask for clarification on this, she is unwilling to discuss this with me.   I reviewed that it can be dangerous to try to use medications at home, and/or other home surgical attempts to interrupt a pregnancy, that it can lead to severe bleeding, sepsis.    The pt then indicated that she believes that I have medications to help stop the pregnancy, that I am just unwilling to help her.  I did let her know that I do not have training to provide abortions, but that at any rate, the medications are not indicated at this [place].     Social work consulted to help see if there are groups that can help to get her to [place] for a procedure.      She indicates that she is safe today.  No one is harming her.      Once again reviewed adoption.      iob labs drawn today.      Will have her return in 2 weeks to see how she is doing.      Declined flu vaccine today.          The entire conversation today was conducted through the use of a professional .    No follow-ups on file.       ======================================================    SUBJECTIVE  Ninoska Clemons is a 31 year old female here for   1.  Unwanted pregnancy:  Unfortunately[facility] only [facility] " [facility] at this time, and [facility] are unable to [facility] to have an  by 23w6d, which is tomorrow . A late term  at this time is only available in several states.  [place] being one of them, and I was able to get this information from [facility].    She doesn't want the baby, she does not want to deliver it.  She does not want to see the baby's face.       2. nexplanon removal:      ROS  Complete 10 point review of systems negative except as noted above in HPI      OBJECTIVE  /60   Pulse 110   Temp 99.3  F (37.4  C) (Oral)   Resp 16   Wt 68.9 kg (152 lb)   SpO2 98%   BMI 28.72 kg/m     Gen:  Crying, very upset.   Gravid, but does not want to listen to the baby at this time.    nexplanon in left arm.    Current Outpatient Medications   Medication     acetaminophen (TYLENOL) 325 MG tablet     etonogestreL (NEXPLANON) 68 mg Impl implant     No current facility-administered medications for this visit.      Patient Active Problem List   Diagnosis     ASCUS of cervix with negative high risk HPV        LABS & IMAGES   Results for orders placed or performed in visit on 21   UA reflex to Microscopic     Status: Abnormal   Result Value Ref Range    Color Urine Yellow Colorless, Straw, Light Yellow, Yellow    Appearance Urine Cloudy (A) Clear    Glucose Urine Negative Negative mg/dL    Bilirubin Urine Negative Negative    Ketones Urine Trace (A) Negative mg/dL    Specific Gravity Urine 1.015 1.005 - 1.030    Blood Urine Negative Negative    pH Urine 6.0 5.0 - 7.0    Protein Albumin Urine Negative Negative mg/dL    Urobilinogen Urine 0.2 0.2, 1.0 E.U./dL    Nitrite Urine Negative Negative    Leukocyte Esterase Urine Negative Negative   Urine Microscopic Exam     Status: Abnormal   Result Value Ref Range    Bacteria Urine None Seen None Seen /HPF    RBC Urine None Seen 0-2 /HPF /HPF    WBC Urine None Seen 0-5 /HPF /HPF    Squamous Epithelials Urine Many  (A) None Seen /LPF   CBC with Platelets & Differential     Status: None (In process)    Narrative    The following orders were created for panel order CBC with Platelets & Differential.  Procedure                               Abnormality         Status                     ---------                               -----------         ------                     CBC with platelets and d...[[id number]]                      In process                   Please view results for these tests on the individual orders.   ABO/Rh type and screen     Status: None (In process)    Narrative    The following orders were created for panel order ABO/Rh type and screen.  Procedure                               Abnormality         Status                     ---------                               -----------         ------                     Adult Type and Screen[[id number]]                            In process                   Please view results for these tests on the individual orders.         ======================================================    MDM          Options for treatment and follow-up care were reviewed with the patient. elijah [facility] and/or guardian was engaged and actively involved in the decision making process. elijah [facility] and/or guardian verbalized understanding of the options discussed and was satisfied with the final plan.      Dora Walker MD        Nexplanon Removal:     Is a pregnancy test required: No.  Was a consent obtained?  Yes    Ninoska Ramosh is here for removal of etonogestrel implant Nexplanon/Implanon    Indication: advanced pregnancy      Preoperative Diagnosis: etonogestrel implant  Postoperative Diagnosis: etonogestrel implant removed    Technique: On the left arm  Skin prep Betadine  Anesthesia 1% lidocaine  Procedure: Small incision (<5mm) was made at distal end of palpable implant, curved hemostat or mosquito forceps was used to isolate the implant and bring it to the incision, the fibrous capsule  containing the implant  was incised and the Implant was removed intact.      EBL: minimal  Complications:  No  Tolerance:  Pt tolerated procedure well and was in stable condition.   Dressing:    A pressure bandage was placed for the next 12-24 hours.          Follow up: Pt was instructed to call if bleeding, severe pain or foul smell.     Dora Walker MD

## 2021-09-24 NOTE — PROGRESS NOTES
Clinic Care Coordination Contact  Community Health Worker Initial Outreach    CHW Initial Information Gathering:  Referral Source: PCP  Preferred Hospital: [facility]  [phone]  Preferred Urgent Care: [facility], [phone]  Current living arrangement:: I live in a private home with family  Type of residence:: Apartment  Community Resources: None  Supplies used at home:: None  Equipment Currently Used at Home: none  Informal Support system:: Family  No PCP office visit in Past Year: No  Transportation means:: Regular car  CHW Additional Questions  If ED/Hospital discharge, follow-up appointment scheduled as recommended?: N/A  Medication changes made following ED/Hospital discharge?: N/A  MyChart active?: No  Patient agreeable to assistance with activating MyChart?: No    Patient accepts CC: Yes. Patient scheduled for assessment with CCC SW on Wednesday 10/13/2021 at 10am. Patient noted desire to discuss resources for adoption agencies.     Per PCP patient is interested in adoption resources as she is currently pregnant and does not want to keep the baby.

## 2021-09-25 LAB
HBV SURFACE AG SERPL QL IA: NONREACTIVE
T PALLIDUM AB SER QL: NEGATIVE

## 2021-09-26 LAB — BACTERIA UR CULT: NO GROWTH

## 2021-09-27 LAB
HCV AB SERPL QL IA: NEGATIVE
LEAD BLDV-MCNC: 4.7 UG/DL
RUBV IGG SERPL QL IA: POSITIVE

## 2021-10-13 ENCOUNTER — PATIENT OUTREACH (OUTPATIENT)
Dept: CARE COORDINATION | Facility: CLINIC | Age: 31
End: 2021-10-13

## 2022-01-25 ENCOUNTER — HOSPITAL ENCOUNTER (INPATIENT)
Facility: HOSPITAL | Age: 32
LOS: 3 days | Discharge: HOME | End: 2022-01-28
Attending: FAMILY MEDICINE | Admitting: FAMILY MEDICINE
Payer: COMMERCIAL

## 2022-01-25 ENCOUNTER — APPOINTMENT (OUTPATIENT)
Dept: ULTRASOUND IMAGING | Facility: HOSPITAL | Age: 32
End: 2022-01-25
Attending: FAMILY MEDICINE
Payer: COMMERCIAL

## 2022-01-25 ENCOUNTER — ANESTHESIA (OUTPATIENT)
Dept: OBGYN | Facility: HOSPITAL | Age: 32
End: 2022-01-25
Payer: COMMERCIAL

## 2022-01-25 ENCOUNTER — ANESTHESIA EVENT (OUTPATIENT)
Dept: OBGYN | Facility: HOSPITAL | Age: 32
End: 2022-01-25
Payer: COMMERCIAL

## 2022-01-25 DIAGNOSIS — R87.610 ASCUS OF CERVIX WITH NEGATIVE HIGH RISK HPV: ICD-10-CM

## 2022-01-25 DIAGNOSIS — E66.3 OVERWEIGHT (BMI 25.0-29.9): ICD-10-CM

## 2022-01-25 DIAGNOSIS — O09.93 SUPERVISION OF HIGH RISK PREGNANCY IN THIRD TRIMESTER: ICD-10-CM

## 2022-01-25 DIAGNOSIS — O09.33 INSUFFICIENT PRENATAL CARE IN THIRD TRIMESTER: ICD-10-CM

## 2022-01-25 DIAGNOSIS — O09.893: ICD-10-CM

## 2022-01-25 DIAGNOSIS — O34.219 HISTORY OF CESAREAN DELIVERY, CURRENTLY PREGNANT: ICD-10-CM

## 2022-01-25 DIAGNOSIS — Z79.899: ICD-10-CM

## 2022-01-25 DIAGNOSIS — D50.9 MICROCYTIC ANEMIA: Primary | ICD-10-CM

## 2022-01-25 DIAGNOSIS — Z64.0 UNPLANNED UNWANTED PREGNANCY: ICD-10-CM

## 2022-01-25 PROBLEM — Z36.89 ENCOUNTER FOR TRIAGE IN PREGNANT PATIENT: Status: ACTIVE | Noted: 2022-01-25

## 2022-01-25 PROBLEM — Z34.90 PREGNANCY: Status: ACTIVE | Noted: 2022-01-25

## 2022-01-25 PROBLEM — O09.90 SUPERVISION OF HIGH-RISK PREGNANCY: Status: ACTIVE | Noted: 2022-01-25

## 2022-01-25 LAB
ABO/RH(D): NORMAL
AMPHETAMINES UR QL SCN: NORMAL
ANTIBODY SCREEN: NEGATIVE
APTT PPP: 29 SECONDS (ref 22–38)
BARBITURATES UR QL: NORMAL
BENZODIAZ UR QL: NORMAL
CANNABINOIDS UR QL SCN: NORMAL
COCAINE UR QL: NORMAL
CREAT UR-MCNC: 18 MG/DL
D DIMER PPP FEU-MCNC: 7.46 UG/ML FEU (ref 0–0.5)
ERYTHROCYTE [DISTWIDTH] IN BLOOD BY AUTOMATED COUNT: 22.8 % (ref 10–15)
FIBRINOGEN PPP-MCNC: 327 MG/DL (ref 170–490)
HBA1C MFR BLD: 5 %
HCT VFR BLD AUTO: 30 % (ref 35–47)
HGB BLD-MCNC: 8.4 G/DL (ref 11.7–15.7)
HGB BLD-MCNC: 9.1 G/DL (ref 11.7–15.7)
INR PPP: 1.04 (ref 0.85–1.15)
INR PPP: 1.06 (ref 0.85–1.15)
MCH RBC QN AUTO: 20.7 PG (ref 26.5–33)
MCHC RBC AUTO-ENTMCNC: 30.3 G/DL (ref 31.5–36.5)
MCV RBC AUTO: 68 FL (ref 78–100)
OPIATES UR QL SCN: NORMAL
OXYCODONE UR QL: NORMAL
PCP UR QL SCN: NORMAL
PLATELET # BLD AUTO: 255 10E3/UL (ref 150–450)
PLATELET # BLD AUTO: 304 10E3/UL (ref 150–450)
RBC # BLD AUTO: 4.4 10E6/UL (ref 3.8–5.2)
SARS-COV-2 RNA RESP QL NAA+PROBE: NEGATIVE
SPECIMEN EXPIRATION DATE: NORMAL
WBC # BLD AUTO: 9.6 10E3/UL (ref 4–11)

## 2022-01-25 PROCEDURE — 84466 ASSAY OF TRANSFERRIN: CPT | Performed by: FAMILY MEDICINE

## 2022-01-25 PROCEDURE — 85041 AUTOMATED RBC COUNT: CPT | Performed by: FAMILY MEDICINE

## 2022-01-25 PROCEDURE — 250N000011 HC RX IP 250 OP 636: Performed by: OBSTETRICS & GYNECOLOGY

## 2022-01-25 PROCEDURE — 76816 OB US FOLLOW-UP PER FETUS: CPT

## 2022-01-25 PROCEDURE — 85610 PROTHROMBIN TIME: CPT | Performed by: OBSTETRICS & GYNECOLOGY

## 2022-01-25 PROCEDURE — 258N000003 HC RX IP 258 OP 636: Performed by: ANESTHESIOLOGY

## 2022-01-25 PROCEDURE — 250N000009 HC RX 250: Performed by: OBSTETRICS & GYNECOLOGY

## 2022-01-25 PROCEDURE — 36415 COLL VENOUS BLD VENIPUNCTURE: CPT | Performed by: OBSTETRICS & GYNECOLOGY

## 2022-01-25 PROCEDURE — 85610 PROTHROMBIN TIME: CPT | Performed by: FAMILY MEDICINE

## 2022-01-25 PROCEDURE — 258N000003 HC RX IP 258 OP 636: Performed by: FAMILY MEDICINE

## 2022-01-25 PROCEDURE — 85018 HEMOGLOBIN: CPT | Performed by: OBSTETRICS & GYNECOLOGY

## 2022-01-25 PROCEDURE — 85730 THROMBOPLASTIN TIME PARTIAL: CPT | Performed by: OBSTETRICS & GYNECOLOGY

## 2022-01-25 PROCEDURE — 86901 BLOOD TYPING SEROLOGIC RH(D): CPT | Performed by: FAMILY MEDICINE

## 2022-01-25 PROCEDURE — 85384 FIBRINOGEN ACTIVITY: CPT | Performed by: OBSTETRICS & GYNECOLOGY

## 2022-01-25 PROCEDURE — 250N000013 HC RX MED GY IP 250 OP 250 PS 637: Performed by: OBSTETRICS & GYNECOLOGY

## 2022-01-25 PROCEDURE — 85049 AUTOMATED PLATELET COUNT: CPT | Performed by: OBSTETRICS & GYNECOLOGY

## 2022-01-25 PROCEDURE — 76819 FETAL BIOPHYS PROFIL W/O NST: CPT

## 2022-01-25 PROCEDURE — 250N000009 HC RX 250: Performed by: FAMILY MEDICINE

## 2022-01-25 PROCEDURE — 360N000076 HC SURGERY LEVEL 3, PER MIN: Performed by: OBSTETRICS & GYNECOLOGY

## 2022-01-25 PROCEDURE — 36415 COLL VENOUS BLD VENIPUNCTURE: CPT | Performed by: FAMILY MEDICINE

## 2022-01-25 PROCEDURE — 59514 CESAREAN DELIVERY ONLY: CPT | Mod: 80 | Performed by: FAMILY MEDICINE

## 2022-01-25 PROCEDURE — 258N000003 HC RX IP 258 OP 636: Performed by: NURSE ANESTHETIST, CERTIFIED REGISTERED

## 2022-01-25 PROCEDURE — 83021 HEMOGLOBIN CHROMOTOGRAPHY: CPT | Performed by: FAMILY MEDICINE

## 2022-01-25 PROCEDURE — 80307 DRUG TEST PRSMV CHEM ANLYZR: CPT | Performed by: FAMILY MEDICINE

## 2022-01-25 PROCEDURE — 370N000017 HC ANESTHESIA TECHNICAL FEE, PER MIN: Performed by: OBSTETRICS & GYNECOLOGY

## 2022-01-25 PROCEDURE — 120N000001 HC R&B MED SURG/OB

## 2022-01-25 PROCEDURE — 85379 FIBRIN DEGRADATION QUANT: CPT | Performed by: OBSTETRICS & GYNECOLOGY

## 2022-01-25 PROCEDURE — 250N000011 HC RX IP 250 OP 636: Performed by: ANESTHESIOLOGY

## 2022-01-25 PROCEDURE — 83036 HEMOGLOBIN GLYCOSYLATED A1C: CPT | Performed by: FAMILY MEDICINE

## 2022-01-25 PROCEDURE — 999N000249 HC STATISTIC C-SECTION ON UNIT

## 2022-01-25 PROCEDURE — 87653 STREP B DNA AMP PROBE: CPT | Performed by: FAMILY MEDICINE

## 2022-01-25 PROCEDURE — 250N000011 HC RX IP 250 OP 636: Performed by: NURSE ANESTHETIST, CERTIFIED REGISTERED

## 2022-01-25 PROCEDURE — 272N000001 HC OR GENERAL SUPPLY STERILE: Performed by: OBSTETRICS & GYNECOLOGY

## 2022-01-25 PROCEDURE — 87635 SARS-COV-2 COVID-19 AMP PRB: CPT | Performed by: FAMILY MEDICINE

## 2022-01-25 PROCEDURE — 250N000009 HC RX 250: Performed by: ANESTHESIOLOGY

## 2022-01-25 PROCEDURE — 87491 CHLMYD TRACH DNA AMP PROBE: CPT | Performed by: FAMILY MEDICINE

## 2022-01-25 RX ORDER — MISOPROSTOL 200 UG/1
800 TABLET ORAL
Status: DISCONTINUED | OUTPATIENT
Start: 2022-01-25 | End: 2022-01-26 | Stop reason: HOSPADM

## 2022-01-25 RX ORDER — SODIUM CHLORIDE, SODIUM LACTATE, POTASSIUM CHLORIDE, CALCIUM CHLORIDE 600; 310; 30; 20 MG/100ML; MG/100ML; MG/100ML; MG/100ML
INJECTION, SOLUTION INTRAVENOUS CONTINUOUS
Status: DISCONTINUED | OUTPATIENT
Start: 2022-01-25 | End: 2022-01-25

## 2022-01-25 RX ORDER — CEFAZOLIN SODIUM 2 G/100ML
2 INJECTION, SOLUTION INTRAVENOUS
Status: COMPLETED | OUTPATIENT
Start: 2022-01-25 | End: 2022-01-25

## 2022-01-25 RX ORDER — OXYTOCIN/0.9 % SODIUM CHLORIDE 30/500 ML
340 PLASTIC BAG, INJECTION (ML) INTRAVENOUS CONTINUOUS PRN
Status: COMPLETED | OUTPATIENT
Start: 2022-01-25 | End: 2022-01-25

## 2022-01-25 RX ORDER — MORPHINE SULFATE 1 MG/ML
INJECTION, SOLUTION EPIDURAL; INTRATHECAL; INTRAVENOUS
Status: COMPLETED | OUTPATIENT
Start: 2022-01-25 | End: 2022-01-25

## 2022-01-25 RX ORDER — CITRIC ACID/SODIUM CITRATE 334-500MG
30 SOLUTION, ORAL ORAL
Status: COMPLETED | OUTPATIENT
Start: 2022-01-25 | End: 2022-01-25

## 2022-01-25 RX ORDER — ONDANSETRON 2 MG/ML
4 INJECTION INTRAMUSCULAR; INTRAVENOUS EVERY 6 HOURS PRN
Status: DISCONTINUED | OUTPATIENT
Start: 2022-01-25 | End: 2022-01-26 | Stop reason: HOSPADM

## 2022-01-25 RX ORDER — CEFAZOLIN SODIUM 2 G/100ML
2 INJECTION, SOLUTION INTRAVENOUS SEE ADMIN INSTRUCTIONS
Status: DISCONTINUED | OUTPATIENT
Start: 2022-01-25 | End: 2022-01-26 | Stop reason: HOSPADM

## 2022-01-25 RX ORDER — OXYCODONE HYDROCHLORIDE 5 MG/1
5 TABLET ORAL EVERY 4 HOURS PRN
Status: CANCELLED | OUTPATIENT
Start: 2022-01-25

## 2022-01-25 RX ORDER — MISOPROSTOL 200 UG/1
400 TABLET ORAL
Status: DISCONTINUED | OUTPATIENT
Start: 2022-01-25 | End: 2022-01-26 | Stop reason: HOSPADM

## 2022-01-25 RX ORDER — ACETAMINOPHEN 325 MG/1
975 TABLET ORAL ONCE
Status: COMPLETED | OUTPATIENT
Start: 2022-01-25 | End: 2022-01-25

## 2022-01-25 RX ORDER — SODIUM CHLORIDE, SODIUM LACTATE, POTASSIUM CHLORIDE, CALCIUM CHLORIDE 600; 310; 30; 20 MG/100ML; MG/100ML; MG/100ML; MG/100ML
INJECTION, SOLUTION INTRAVENOUS CONTINUOUS
Status: DISCONTINUED | OUTPATIENT
Start: 2022-01-25 | End: 2022-01-28 | Stop reason: HOSPADM

## 2022-01-25 RX ORDER — OXYTOCIN 10 [USP'U]/ML
10 INJECTION, SOLUTION INTRAMUSCULAR; INTRAVENOUS
Status: DISCONTINUED | OUTPATIENT
Start: 2022-01-25 | End: 2022-01-28 | Stop reason: HOSPADM

## 2022-01-25 RX ORDER — NALOXONE HYDROCHLORIDE 0.4 MG/ML
0.4 INJECTION, SOLUTION INTRAMUSCULAR; INTRAVENOUS; SUBCUTANEOUS
Status: DISCONTINUED | OUTPATIENT
Start: 2022-01-25 | End: 2022-01-26 | Stop reason: HOSPADM

## 2022-01-25 RX ORDER — ONDANSETRON 2 MG/ML
4 INJECTION INTRAMUSCULAR; INTRAVENOUS EVERY 30 MIN PRN
Status: CANCELLED | OUTPATIENT
Start: 2022-01-25

## 2022-01-25 RX ORDER — ONDANSETRON 2 MG/ML
INJECTION INTRAMUSCULAR; INTRAVENOUS PRN
Status: DISCONTINUED | OUTPATIENT
Start: 2022-01-25 | End: 2022-01-25

## 2022-01-25 RX ORDER — BUPIVACAINE HYDROCHLORIDE 7.5 MG/ML
INJECTION, SOLUTION INTRASPINAL
Status: COMPLETED | OUTPATIENT
Start: 2022-01-25 | End: 2022-01-25

## 2022-01-25 RX ORDER — OXYTOCIN 10 [USP'U]/ML
10 INJECTION, SOLUTION INTRAMUSCULAR; INTRAVENOUS
Status: DISCONTINUED | OUTPATIENT
Start: 2022-01-25 | End: 2022-01-26 | Stop reason: HOSPADM

## 2022-01-25 RX ORDER — ONDANSETRON 4 MG/1
4 TABLET, ORALLY DISINTEGRATING ORAL EVERY 6 HOURS PRN
Status: DISCONTINUED | OUTPATIENT
Start: 2022-01-25 | End: 2022-01-26 | Stop reason: HOSPADM

## 2022-01-25 RX ORDER — OXYTOCIN/0.9 % SODIUM CHLORIDE 30/500 ML
100-340 PLASTIC BAG, INJECTION (ML) INTRAVENOUS CONTINUOUS PRN
Status: DISCONTINUED | OUTPATIENT
Start: 2022-01-25 | End: 2022-01-28 | Stop reason: HOSPADM

## 2022-01-25 RX ORDER — CARBOPROST TROMETHAMINE 250 UG/ML
250 INJECTION, SOLUTION INTRAMUSCULAR
Status: DISCONTINUED | OUTPATIENT
Start: 2022-01-25 | End: 2022-01-26 | Stop reason: HOSPADM

## 2022-01-25 RX ORDER — KETOROLAC TROMETHAMINE 30 MG/ML
30 INJECTION, SOLUTION INTRAMUSCULAR; INTRAVENOUS
Status: DISCONTINUED | OUTPATIENT
Start: 2022-01-25 | End: 2022-01-28 | Stop reason: HOSPADM

## 2022-01-25 RX ORDER — OXYTOCIN 10 [USP'U]/ML
10 INJECTION, SOLUTION INTRAMUSCULAR; INTRAVENOUS
Status: CANCELLED | OUTPATIENT
Start: 2022-01-25

## 2022-01-25 RX ORDER — PROCHLORPERAZINE 25 MG
25 SUPPOSITORY, RECTAL RECTAL EVERY 12 HOURS PRN
Status: DISCONTINUED | OUTPATIENT
Start: 2022-01-25 | End: 2022-01-26 | Stop reason: HOSPADM

## 2022-01-25 RX ORDER — IBUPROFEN 600 MG/1
600 TABLET, FILM COATED ORAL
Status: DISCONTINUED | OUTPATIENT
Start: 2022-01-25 | End: 2022-01-28 | Stop reason: HOSPADM

## 2022-01-25 RX ORDER — ONDANSETRON 4 MG/1
4 TABLET, ORALLY DISINTEGRATING ORAL EVERY 30 MIN PRN
Status: CANCELLED | OUTPATIENT
Start: 2022-01-25

## 2022-01-25 RX ORDER — LIDOCAINE 40 MG/G
CREAM TOPICAL
Status: DISCONTINUED | OUTPATIENT
Start: 2022-01-25 | End: 2022-01-26 | Stop reason: HOSPADM

## 2022-01-25 RX ORDER — PROCHLORPERAZINE MALEATE 10 MG
10 TABLET ORAL EVERY 6 HOURS PRN
Status: DISCONTINUED | OUTPATIENT
Start: 2022-01-25 | End: 2022-01-26 | Stop reason: HOSPADM

## 2022-01-25 RX ORDER — FENTANYL CITRATE 50 UG/ML
25 INJECTION, SOLUTION INTRAMUSCULAR; INTRAVENOUS EVERY 5 MIN PRN
Status: CANCELLED | OUTPATIENT
Start: 2022-01-25

## 2022-01-25 RX ORDER — OXYTOCIN/0.9 % SODIUM CHLORIDE 30/500 ML
100-340 PLASTIC BAG, INJECTION (ML) INTRAVENOUS CONTINUOUS PRN
Status: CANCELLED | OUTPATIENT
Start: 2022-01-25

## 2022-01-25 RX ORDER — FENTANYL CITRATE-0.9 % NACL/PF 10 MCG/ML
100 PLASTIC BAG, INJECTION (ML) INTRAVENOUS EVERY 5 MIN PRN
Status: DISCONTINUED | OUTPATIENT
Start: 2022-01-25 | End: 2022-01-26 | Stop reason: HOSPADM

## 2022-01-25 RX ORDER — NALBUPHINE HYDROCHLORIDE 10 MG/ML
2.5-5 INJECTION, SOLUTION INTRAMUSCULAR; INTRAVENOUS; SUBCUTANEOUS EVERY 6 HOURS PRN
Status: DISCONTINUED | OUTPATIENT
Start: 2022-01-25 | End: 2022-01-28

## 2022-01-25 RX ORDER — ACETAMINOPHEN 325 MG/1
650 TABLET ORAL ONCE
Status: COMPLETED | OUTPATIENT
Start: 2022-01-25 | End: 2022-01-25

## 2022-01-25 RX ORDER — LIDOCAINE 40 MG/G
CREAM TOPICAL
Status: DISCONTINUED | OUTPATIENT
Start: 2022-01-25 | End: 2022-01-25 | Stop reason: HOSPADM

## 2022-01-25 RX ORDER — HYDROMORPHONE HCL IN WATER/PF 6 MG/30 ML
0.2 PATIENT CONTROLLED ANALGESIA SYRINGE INTRAVENOUS EVERY 5 MIN PRN
Status: CANCELLED | OUTPATIENT
Start: 2022-01-25

## 2022-01-25 RX ORDER — BUPIVACAINE HYDROCHLORIDE AND EPINEPHRINE 2.5; 5 MG/ML; UG/ML
INJECTION, SOLUTION INFILTRATION; PERINEURAL
Status: COMPLETED | OUTPATIENT
Start: 2022-01-25 | End: 2022-01-25

## 2022-01-25 RX ORDER — METHYLERGONOVINE MALEATE 0.2 MG/ML
200 INJECTION INTRAVENOUS
Status: DISCONTINUED | OUTPATIENT
Start: 2022-01-25 | End: 2022-01-26 | Stop reason: HOSPADM

## 2022-01-25 RX ORDER — NALOXONE HYDROCHLORIDE 0.4 MG/ML
0.2 INJECTION, SOLUTION INTRAMUSCULAR; INTRAVENOUS; SUBCUTANEOUS
Status: DISCONTINUED | OUTPATIENT
Start: 2022-01-25 | End: 2022-01-26 | Stop reason: HOSPADM

## 2022-01-25 RX ORDER — OXYTOCIN/0.9 % SODIUM CHLORIDE 30/500 ML
340 PLASTIC BAG, INJECTION (ML) INTRAVENOUS CONTINUOUS PRN
Status: DISCONTINUED | OUTPATIENT
Start: 2022-01-25 | End: 2022-01-26 | Stop reason: HOSPADM

## 2022-01-25 RX ORDER — CEFAZOLIN SODIUM 2 G/100ML
2 INJECTION, SOLUTION INTRAVENOUS
Status: DISCONTINUED | OUTPATIENT
Start: 2022-01-25 | End: 2022-01-26

## 2022-01-25 RX ORDER — METOCLOPRAMIDE 10 MG/1
10 TABLET ORAL EVERY 6 HOURS PRN
Status: DISCONTINUED | OUTPATIENT
Start: 2022-01-25 | End: 2022-01-26 | Stop reason: HOSPADM

## 2022-01-25 RX ORDER — SODIUM CHLORIDE, SODIUM LACTATE, POTASSIUM CHLORIDE, CALCIUM CHLORIDE 600; 310; 30; 20 MG/100ML; MG/100ML; MG/100ML; MG/100ML
INJECTION, SOLUTION INTRAVENOUS CONTINUOUS
Status: CANCELLED | OUTPATIENT
Start: 2022-01-25

## 2022-01-25 RX ORDER — LOPERAMIDE HCL 2 MG
2 CAPSULE ORAL ONCE
Status: COMPLETED | OUTPATIENT
Start: 2022-01-25 | End: 2022-01-25

## 2022-01-25 RX ORDER — METOCLOPRAMIDE HYDROCHLORIDE 5 MG/ML
10 INJECTION INTRAMUSCULAR; INTRAVENOUS EVERY 6 HOURS PRN
Status: DISCONTINUED | OUTPATIENT
Start: 2022-01-25 | End: 2022-01-26 | Stop reason: HOSPADM

## 2022-01-25 RX ORDER — FENTANYL CITRATE 50 UG/ML
15 INJECTION, SOLUTION INTRAMUSCULAR; INTRAVENOUS ONCE
Status: DISCONTINUED | OUTPATIENT
Start: 2022-01-25 | End: 2022-01-26 | Stop reason: HOSPADM

## 2022-01-25 RX ORDER — ACETAMINOPHEN 325 MG/1
325 TABLET ORAL
COMMUNITY
Start: 2021-04-29 | End: 2022-03-21

## 2022-01-25 RX ORDER — ETONOGESTREL 68 MG/1
1 IMPLANT SUBCUTANEOUS
Status: ON HOLD | COMMUNITY
Start: 2021-04-29 | End: 2022-01-25

## 2022-01-25 RX ORDER — MORPHINE SULFATE 0.5 MG/ML
150 INJECTION, SOLUTION EPIDURAL; INTRATHECAL; INTRAVENOUS ONCE
Status: DISCONTINUED | OUTPATIENT
Start: 2022-01-25 | End: 2022-01-26 | Stop reason: HOSPADM

## 2022-01-25 RX ORDER — FENTANYL CITRATE 50 UG/ML
INJECTION, SOLUTION INTRAMUSCULAR; INTRAVENOUS
Status: COMPLETED | OUTPATIENT
Start: 2022-01-25 | End: 2022-01-25

## 2022-01-25 RX ADMIN — PHENYLEPHRINE HYDROCHLORIDE 100 MCG: 10 INJECTION INTRAVENOUS at 19:09

## 2022-01-25 RX ADMIN — PHENYLEPHRINE HYDROCHLORIDE 100 MCG: 10 INJECTION INTRAVENOUS at 20:24

## 2022-01-25 RX ADMIN — FENTANYL CITRATE 15 MCG: 50 INJECTION, SOLUTION INTRAMUSCULAR; INTRAVENOUS at 19:52

## 2022-01-25 RX ADMIN — ONDANSETRON 4 MG: 2 INJECTION INTRAMUSCULAR; INTRAVENOUS at 19:50

## 2022-01-25 RX ADMIN — PHENYLEPHRINE HYDROCHLORIDE 100 MCG: 10 INJECTION INTRAVENOUS at 20:21

## 2022-01-25 RX ADMIN — Medication 0.15 MG: at 19:52

## 2022-01-25 RX ADMIN — PHENYLEPHRINE HYDROCHLORIDE 100 MCG: 10 INJECTION INTRAVENOUS at 19:58

## 2022-01-25 RX ADMIN — SODIUM CHLORIDE, POTASSIUM CHLORIDE, SODIUM LACTATE AND CALCIUM CHLORIDE: 600; 310; 30; 20 INJECTION, SOLUTION INTRAVENOUS at 19:48

## 2022-01-25 RX ADMIN — PHENYLEPHRINE HYDROCHLORIDE 100 MCG: 10 INJECTION INTRAVENOUS at 20:39

## 2022-01-25 RX ADMIN — Medication 100 ML/HR: at 23:13

## 2022-01-25 RX ADMIN — PHENYLEPHRINE HYDROCHLORIDE 100 MCG: 10 INJECTION INTRAVENOUS at 20:34

## 2022-01-25 RX ADMIN — PHENYLEPHRINE HYDROCHLORIDE 100 MCG: 10 INJECTION INTRAVENOUS at 20:23

## 2022-01-25 RX ADMIN — ACETAMINOPHEN 650 MG: 325 TABLET ORAL at 23:48

## 2022-01-25 RX ADMIN — METHYLERGONOVINE MALEATE 200 MCG: 0.2 INJECTION INTRAVENOUS at 20:19

## 2022-01-25 RX ADMIN — SODIUM CITRATE AND CITRIC ACID MONOHYDRATE 30 ML: 500; 334 SOLUTION ORAL at 19:40

## 2022-01-25 RX ADMIN — BUPIVACAINE HYDROCHLORIDE IN DEXTROSE 1.4 ML: 7.5 INJECTION, SOLUTION SUBARACHNOID at 19:52

## 2022-01-25 RX ADMIN — SODIUM CHLORIDE, POTASSIUM CHLORIDE, SODIUM LACTATE AND CALCIUM CHLORIDE 1000 ML: 600; 310; 30; 20 INJECTION, SOLUTION INTRAVENOUS at 17:19

## 2022-01-25 RX ADMIN — PHENYLEPHRINE HYDROCHLORIDE 0.3 MCG/KG/MIN: 10 INJECTION INTRAVENOUS at 19:50

## 2022-01-25 RX ADMIN — Medication 600 ML/HR: at 20:19

## 2022-01-25 RX ADMIN — BUPIVACAINE HYDROCHLORIDE AND EPINEPHRINE BITARTRATE 40 ML: 2.5; .005 INJECTION, SOLUTION INFILTRATION; PERINEURAL at 21:15

## 2022-01-25 RX ADMIN — PHENYLEPHRINE HYDROCHLORIDE 100 MCG: 10 INJECTION INTRAVENOUS at 20:45

## 2022-01-25 RX ADMIN — CEFAZOLIN SODIUM 2 G: 2 INJECTION, SOLUTION INTRAVENOUS at 19:51

## 2022-01-25 RX ADMIN — CARBOPROST TROMETHAMINE 250 MCG: 250 INJECTION, SOLUTION INTRAMUSCULAR at 20:22

## 2022-01-25 RX ADMIN — PHENYLEPHRINE HYDROCHLORIDE 100 MCG: 10 INJECTION INTRAVENOUS at 20:27

## 2022-01-25 RX ADMIN — PHENYLEPHRINE HYDROCHLORIDE 100 MCG: 10 INJECTION INTRAVENOUS at 20:43

## 2022-01-25 RX ADMIN — PHENYLEPHRINE HYDROCHLORIDE 100 MCG: 10 INJECTION INTRAVENOUS at 19:54

## 2022-01-25 RX ADMIN — TRANEXAMIC ACID 1 G: 100 INJECTION, SOLUTION INTRAVENOUS at 19:35

## 2022-01-25 RX ADMIN — PHENYLEPHRINE HYDROCHLORIDE 100 MCG: 10 INJECTION INTRAVENOUS at 20:48

## 2022-01-25 RX ADMIN — SODIUM CHLORIDE, POTASSIUM CHLORIDE, SODIUM LACTATE AND CALCIUM CHLORIDE: 600; 310; 30; 20 INJECTION, SOLUTION INTRAVENOUS at 20:50

## 2022-01-25 RX ADMIN — LOPERAMIDE HYDROCHLORIDE 2 MG: 2 CAPSULE ORAL at 23:48

## 2022-01-25 RX ADMIN — ACETAMINOPHEN 975 MG: 325 TABLET ORAL at 19:32

## 2022-01-25 ASSESSMENT — SOCIAL DETERMINANTS OF HEALTH (SDOH): WITHIN THE LAST YEAR, HAVE YOU BEEN AFRAID OF YOUR PARTNER OR EX-PARTNER?: YES

## 2022-01-25 ASSESSMENT — MIFFLIN-ST. JEOR: SCORE: 1330.04

## 2022-01-25 NOTE — PROGRESS NOTES
BPP 6/8, off for tone. Dr. Morton updated on lab results. [facility] consulted by Dr. Morton,  section scheduled for  at 0930. Okay for patient to go home and come back on Thursday.

## 2022-01-25 NOTE — PROGRESS NOTES
Received order to see patient.  Preview chart notes.  will see patient with an  after birth of baby to discuss options.   continue to follow.    KAYLEN Hudson

## 2022-01-25 NOTE — PROGRESS NOTES
Patient arrive at [facility] stating she was overdue, having contractions, and wanting her  and tubal. She has not had prenatal care since 21 when she was in the clinic and had her Nexplenon removed, prenatal labs, and US. At that appointment patient had told Dr. Walker she wanted to terminate her pregnancy and did not want to see the baby's face.   She was brought to triage, EFM and TOCO applied.  used for assessment. SVE /-1. Tracing shows irritability, but no regular contractions.   Dr. Morton updated via phone, discussed assessment, patient history and desire for her . Plan to start with labs and ultrasound.

## 2022-01-26 LAB
C TRACH DNA SPEC QL PROBE+SIG AMP: NEGATIVE
GP B STREP DNA SPEC QL NAA+PROBE: NEGATIVE
HGB BLD-MCNC: 7.2 G/DL (ref 11.7–15.7)
IRON SATN MFR SERPL: 5 % (ref 20–50)
IRON SERPL-MCNC: 26 UG/DL (ref 42–175)
N GONORRHOEA DNA SPEC QL NAA+PROBE: NEGATIVE
PATIENT PENICILLIN, AMOXICILLIN, CEPHALOSPORINS ALLERGY: NO
TIBC SERPL-MCNC: 533 UG/DL (ref 313–563)
TRANSFERRIN SERPL-MCNC: 426 MG/DL (ref 212–360)

## 2022-01-26 PROCEDURE — 250N000013 HC RX MED GY IP 250 OP 250 PS 637: Performed by: OBSTETRICS & GYNECOLOGY

## 2022-01-26 PROCEDURE — 36415 COLL VENOUS BLD VENIPUNCTURE: CPT | Performed by: OBSTETRICS & GYNECOLOGY

## 2022-01-26 PROCEDURE — 85018 HEMOGLOBIN: CPT | Performed by: OBSTETRICS & GYNECOLOGY

## 2022-01-26 PROCEDURE — 250N000011 HC RX IP 250 OP 636: Performed by: OBSTETRICS & GYNECOLOGY

## 2022-01-26 PROCEDURE — 120N000001 HC R&B MED SURG/OB

## 2022-01-26 RX ORDER — METOCLOPRAMIDE 10 MG/1
10 TABLET ORAL EVERY 6 HOURS PRN
Status: DISCONTINUED | OUTPATIENT
Start: 2022-01-26 | End: 2022-01-28 | Stop reason: HOSPADM

## 2022-01-26 RX ORDER — PROCHLORPERAZINE 25 MG
25 SUPPOSITORY, RECTAL RECTAL EVERY 12 HOURS PRN
Status: DISCONTINUED | OUTPATIENT
Start: 2022-01-26 | End: 2022-01-28 | Stop reason: HOSPADM

## 2022-01-26 RX ORDER — NALOXONE HYDROCHLORIDE 0.4 MG/ML
0.2 INJECTION, SOLUTION INTRAMUSCULAR; INTRAVENOUS; SUBCUTANEOUS
Status: DISCONTINUED | OUTPATIENT
Start: 2022-01-26 | End: 2022-01-28

## 2022-01-26 RX ORDER — ONDANSETRON 4 MG/1
4 TABLET, ORALLY DISINTEGRATING ORAL EVERY 6 HOURS PRN
Status: DISCONTINUED | OUTPATIENT
Start: 2022-01-26 | End: 2022-01-28 | Stop reason: HOSPADM

## 2022-01-26 RX ORDER — DEXTROSE, SODIUM CHLORIDE, SODIUM LACTATE, POTASSIUM CHLORIDE, AND CALCIUM CHLORIDE 5; .6; .31; .03; .02 G/100ML; G/100ML; G/100ML; G/100ML; G/100ML
INJECTION, SOLUTION INTRAVENOUS CONTINUOUS
Status: DISCONTINUED | OUTPATIENT
Start: 2022-01-26 | End: 2022-01-28 | Stop reason: HOSPADM

## 2022-01-26 RX ORDER — SIMETHICONE 80 MG
80 TABLET,CHEWABLE ORAL 4 TIMES DAILY PRN
Status: DISCONTINUED | OUTPATIENT
Start: 2022-01-26 | End: 2022-01-28 | Stop reason: HOSPADM

## 2022-01-26 RX ORDER — AMOXICILLIN 250 MG
1 CAPSULE ORAL 2 TIMES DAILY
Status: DISCONTINUED | OUTPATIENT
Start: 2022-01-26 | End: 2022-01-28 | Stop reason: HOSPADM

## 2022-01-26 RX ORDER — AMOXICILLIN 250 MG
2 CAPSULE ORAL 2 TIMES DAILY
Status: DISCONTINUED | OUTPATIENT
Start: 2022-01-26 | End: 2022-01-28 | Stop reason: HOSPADM

## 2022-01-26 RX ORDER — PROCHLORPERAZINE MALEATE 10 MG
10 TABLET ORAL EVERY 6 HOURS PRN
Status: DISCONTINUED | OUTPATIENT
Start: 2022-01-26 | End: 2022-01-28 | Stop reason: HOSPADM

## 2022-01-26 RX ORDER — KETOROLAC TROMETHAMINE 30 MG/ML
30 INJECTION, SOLUTION INTRAMUSCULAR; INTRAVENOUS EVERY 6 HOURS
Status: COMPLETED | OUTPATIENT
Start: 2022-01-26 | End: 2022-01-26

## 2022-01-26 RX ORDER — OXYCODONE HYDROCHLORIDE 5 MG/1
5 TABLET ORAL EVERY 4 HOURS PRN
Status: DISCONTINUED | OUTPATIENT
Start: 2022-01-26 | End: 2022-01-28 | Stop reason: HOSPADM

## 2022-01-26 RX ORDER — METOCLOPRAMIDE HYDROCHLORIDE 5 MG/ML
10 INJECTION INTRAMUSCULAR; INTRAVENOUS EVERY 6 HOURS PRN
Status: DISCONTINUED | OUTPATIENT
Start: 2022-01-26 | End: 2022-01-28 | Stop reason: HOSPADM

## 2022-01-26 RX ORDER — MODIFIED LANOLIN
OINTMENT (GRAM) TOPICAL
Status: DISCONTINUED | OUTPATIENT
Start: 2022-01-26 | End: 2022-01-28 | Stop reason: HOSPADM

## 2022-01-26 RX ORDER — METHYLERGONOVINE MALEATE 0.2 MG/ML
200 INJECTION INTRAVENOUS
Status: DISCONTINUED | OUTPATIENT
Start: 2022-01-26 | End: 2022-01-28 | Stop reason: HOSPADM

## 2022-01-26 RX ORDER — IBUPROFEN 800 MG/1
800 TABLET, FILM COATED ORAL EVERY 6 HOURS
Status: DISCONTINUED | OUTPATIENT
Start: 2022-01-26 | End: 2022-01-28 | Stop reason: HOSPADM

## 2022-01-26 RX ORDER — ONDANSETRON 2 MG/ML
4 INJECTION INTRAMUSCULAR; INTRAVENOUS EVERY 6 HOURS PRN
Status: DISCONTINUED | OUTPATIENT
Start: 2022-01-26 | End: 2022-01-28 | Stop reason: HOSPADM

## 2022-01-26 RX ORDER — CARBOPROST TROMETHAMINE 250 UG/ML
250 INJECTION, SOLUTION INTRAMUSCULAR
Status: DISCONTINUED | OUTPATIENT
Start: 2022-01-26 | End: 2022-01-28 | Stop reason: HOSPADM

## 2022-01-26 RX ORDER — HYDROCORTISONE 2.5 %
CREAM (GRAM) TOPICAL 3 TIMES DAILY PRN
Status: DISCONTINUED | OUTPATIENT
Start: 2022-01-26 | End: 2022-01-28 | Stop reason: HOSPADM

## 2022-01-26 RX ORDER — NALOXONE HYDROCHLORIDE 0.4 MG/ML
0.4 INJECTION, SOLUTION INTRAMUSCULAR; INTRAVENOUS; SUBCUTANEOUS
Status: DISCONTINUED | OUTPATIENT
Start: 2022-01-26 | End: 2022-01-28

## 2022-01-26 RX ORDER — LIDOCAINE 40 MG/G
CREAM TOPICAL
Status: DISCONTINUED | OUTPATIENT
Start: 2022-01-26 | End: 2022-01-28 | Stop reason: HOSPADM

## 2022-01-26 RX ORDER — MISOPROSTOL 200 UG/1
400 TABLET ORAL
Status: DISCONTINUED | OUTPATIENT
Start: 2022-01-26 | End: 2022-01-28 | Stop reason: HOSPADM

## 2022-01-26 RX ORDER — MISOPROSTOL 200 UG/1
800 TABLET ORAL
Status: DISCONTINUED | OUTPATIENT
Start: 2022-01-26 | End: 2022-01-28 | Stop reason: HOSPADM

## 2022-01-26 RX ORDER — OXYTOCIN/0.9 % SODIUM CHLORIDE 30/500 ML
340 PLASTIC BAG, INJECTION (ML) INTRAVENOUS CONTINUOUS PRN
Status: DISCONTINUED | OUTPATIENT
Start: 2022-01-26 | End: 2022-01-28 | Stop reason: HOSPADM

## 2022-01-26 RX ORDER — OXYTOCIN 10 [USP'U]/ML
10 INJECTION, SOLUTION INTRAMUSCULAR; INTRAVENOUS
Status: DISCONTINUED | OUTPATIENT
Start: 2022-01-26 | End: 2022-01-28 | Stop reason: HOSPADM

## 2022-01-26 RX ORDER — ACETAMINOPHEN 325 MG/1
975 TABLET ORAL EVERY 6 HOURS
Status: DISCONTINUED | OUTPATIENT
Start: 2022-01-26 | End: 2022-01-28 | Stop reason: HOSPADM

## 2022-01-26 RX ORDER — BISACODYL 10 MG
10 SUPPOSITORY, RECTAL RECTAL DAILY PRN
Status: DISCONTINUED | OUTPATIENT
Start: 2022-01-28 | End: 2022-01-28 | Stop reason: HOSPADM

## 2022-01-26 RX ADMIN — OXYCODONE HYDROCHLORIDE 5 MG: 5 TABLET ORAL at 08:24

## 2022-01-26 RX ADMIN — DOCUSATE SODIUM 50 MG AND SENNOSIDES 8.6 MG 1 TABLET: 8.6; 5 TABLET, FILM COATED ORAL at 08:24

## 2022-01-26 RX ADMIN — KETOROLAC TROMETHAMINE 30 MG: 30 INJECTION, SOLUTION INTRAMUSCULAR at 10:30

## 2022-01-26 RX ADMIN — ACETAMINOPHEN 975 MG: 325 TABLET ORAL at 01:31

## 2022-01-26 RX ADMIN — IBUPROFEN 800 MG: 800 TABLET, FILM COATED ORAL at 21:30

## 2022-01-26 RX ADMIN — SODIUM CHLORIDE, SODIUM LACTATE, POTASSIUM CHLORIDE, CALCIUM CHLORIDE AND DEXTROSE MONOHYDRATE: 5; 600; 310; 30; 20 INJECTION, SOLUTION INTRAVENOUS at 01:39

## 2022-01-26 RX ADMIN — ACETAMINOPHEN 975 MG: 325 TABLET ORAL at 06:34

## 2022-01-26 RX ADMIN — OXYCODONE HYDROCHLORIDE 5 MG: 5 TABLET ORAL at 13:51

## 2022-01-26 RX ADMIN — KETOROLAC TROMETHAMINE 30 MG: 30 INJECTION, SOLUTION INTRAMUSCULAR at 04:12

## 2022-01-26 RX ADMIN — DOCUSATE SODIUM 50 MG AND SENNOSIDES 8.6 MG 1 TABLET: 8.6; 5 TABLET, FILM COATED ORAL at 21:20

## 2022-01-26 RX ADMIN — KETOROLAC TROMETHAMINE 30 MG: 30 INJECTION, SOLUTION INTRAMUSCULAR at 16:37

## 2022-01-26 NOTE — ANESTHESIA CARE TRANSFER NOTE
Patient: Ninoska Clemons    Procedure: Procedure(s):   SECTION       Diagnosis: * No pre-op diagnosis entered *  Diagnosis Additional Information: No value filed.    Anesthesia Type:   Spinal     Note:    Oropharynx: oropharynx clear of all foreign objects and spontaneously breathing  Level of Consciousness: awake  Oxygen Supplementation: room air    Independent Airway: airway patency satisfactory and stable  Dentition: dentition unchanged  Vital Signs Stable: post-procedure vital signs reviewed and stable  Report to RN Given: handoff report given  Patient transferred to: Labor and Delivery    Handoff Report: Identifed the Patient, Identified the Reponsible Provider, Reviewed the pertinent medical history, Discussed the surgical course, Reviewed Intra-OP anesthesia mangement and issues during anesthesia, Set expectations for post-procedure period and Allowed opportunity for questions and acknowledgement of understanding      Vitals:  Vitals Value Taken Time   /73 22   Temp 36.4  C (97.6  F) 22   Pulse 83    Resp 16 22   SpO2 99 % 22       Electronically Signed By: GREGG Clarke CRNA  2022  9:29 PM

## 2022-01-26 NOTE — PROGRESS NOTES
Dr. Benjamin notified of pt a little dizzy second time up, orders for Hgb now and call for less than 7.
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
## 2022-01-26 NOTE — PLAN OF CARE
Problem: Adult Inpatient Plan of Care  Goal: Plan of Care Review  Outcome: Improving  Goal: Absence of Hospital-Acquired Illness or Injury  Outcome: Improving  Intervention: Prevent and Manage VTE (Venous Thromboembolism) Risk  Recent Flowsheet Documentation  Taken 2022 2303 by Wendy Garcia RN  VTE Prevention/Management: pneumatic compression device  Taken 2022 2233 by Wendy Garcia RN  VTE Prevention/Management: pneumatic compression device  Taken 2022 215 by Wendy Garcia RN  VTE Prevention/Management: pneumatic compression device  Goal: Optimal Comfort and Wellbeing  Outcome: Improving     Problem: Adjustment to Role Transition (Postpartum  Delivery)  Goal: Successful Maternal Role Transition  Outcome: Improving  Intervention: Support Maternal Role Transition  Recent Flowsheet Documentation  Taken 2022 by Wendy Garcia RN  Supportive Measures: active listening utilized  Parent/Child Attachment Promotion: (Pt prefers no contact with infant) other (see comments)     Problem: Bleeding (Postpartum  Delivery)  Goal: Hemostasis  Outcome: Improving     Problem: Infection (Postpartum  Delivery)  Goal: Absence of Infection Signs and Symptoms  Outcome: Improving     Problem: Pain (Postpartum  Delivery)  Goal: Acceptable Pain Control  Outcome: Improving     Problem: Postoperative Nausea and Vomiting (Postpartum  Delivery)  Goal: Nausea and Vomiting Relief  Outcome: Improving

## 2022-01-26 NOTE — ANESTHESIA PROCEDURE NOTES
TAP Procedure Note    Pre-Procedure   Staff -        Anesthesiologist:  Nell Rogers MD       Performed By: anesthesiologist       Location: OR       Procedure Start/Stop Times: 1/25/2022 9:10 PM and 1/25/2022 9:16 PM       Pre-Anesthestic Checklist: patient identified, IV checked, site marked, risks and benefits discussed, informed consent, monitors and equipment checked, pre-op evaluation, at physician/surgeon's request and post-op pain management  Timeout:       Correct Patient: Yes        Correct Procedure: Yes        Correct Site: Yes        Correct Position: Yes        Correct Laterality: Yes        Site Marked: Yes  Procedure Documentation  Procedure: TAP       Laterality: bilateral       Patient Position: supine       Patient Prep/Sterile Barriers: sterile gloves, mask       Skin prep: Chloraprep       Needle type: Stimuplex.       Needle Gauge: 21.        Needle Length (Inches): 4        Ultrasound guided       1. Ultrasound was used to identify targeted nerve, plexus, vascular marker, or fascial plane and place a needle adjacent to it in real-time.       2. Ultrasound was used to visualize the spread of anesthetic in close proximity to the above referenced structure.       3. A permanent image is entered into the patient's record.       4. The visualized anatomic structures appeared normal.       5. There were no apparent abnormal pathologic findings.    Assessment/Narrative         The placement was negative for: blood aspirated and site bleedingInjection painful: Sedated.       Paresthesias: No.     Bolus given via needle..        Secured via.        Insertion/Infusion Method: Single Shot       Complications: none       Injection made incrementally with aspirations every 5 mL.    Medication(s) Administered   Bupivacaine 0.25% w/ 1:200K Epi (Injection), 40 mL  Medication Administration Time: 1/25/2022 9:15 PM     Comments:  20cc of local anesthetic per side, total of 40cc.

## 2022-01-26 NOTE — PLAN OF CARE
Problem: Adult Inpatient Plan of Care  Goal: Plan of Care Review  Outcome: Improving     Problem: Adult Inpatient Plan of Care  Goal: Optimal Comfort and Wellbeing  Outcome: Improving     Problem: Pain (Postpartum  Delivery)  Goal: Acceptable Pain Control  Outcome: Improving     Patient appears to be okay this shift. Offering minimal complaints of pain, VSS, barahona remains in place for urinary output.

## 2022-01-26 NOTE — PROGRESS NOTES
1600: Patient given apple juice to attempt to achieve reactive NST prior to discharge.   1646: FHR began to decreased to 60 bpm for approximately 5 minutes before returning to baseline. Dr. Morton updated via phone, plan to consult in house OB for probable  section for tonight.   1733: Patient moved to room 7.   1830: Patient's hair trimmed, cleaned with thelma cloth and scd;s placed.   1900: Dr. Morton and Dr. Wynne at bedside to discuss surgery and consent for c/s via phone .   : Report given to Ivett ROTHMAN RN

## 2022-01-26 NOTE — PROGRESS NOTES
Section - 1st Assist  [facility]  Date of Admission: 2022  Date of Service: 2022    Name      Ninoska Clemons         1990  MRN       [id number]  PCP        Dora Walker at [facility], [phone].    ________________________________________________________________________    : Fran Clemons is a  at 41w2d by Estimated Date of Delivery: 2022 by 23 week US.      INDICATION FOR  DELIVERY: scheduled repeat .     ANESTHESIA: spinal     OPERATIVE DESCRIPTION: Patient was taken to the OR and repeat low transverse  section performed in the usual fashion and without complications.  Please see OB/GYN surgical note for full details.  My assistance with and presence at the  section was medically indicated/necessary for both maternal and fetal well being as well as at the request of Dr. Wynne.  My duties included assisting with visualization, extraction of infant, and other duties as assigned by the primary surgeon.     Patient tolerated procedure well and went to back to room in good and stable condition.    Shania Morton MD, MD, 2022 9:33 PM

## 2022-01-26 NOTE — PROGRESS NOTES
Csection - Post operative day 1    ASSESSMENT: PLAN:   POD#1    Repeat csection  Baby given up for adoption  Poor prenatal care  Acute blood loss anemia- doing well- discussed s/s with patient- will monitor  Doing well  Continue routine cares   aniticipate discharge in am    SUBJECTIVE:    The patient feels well: Catheter is out, bleeding decreased, tolerating normal diet, and passing flatus.  Pain is well controlled. The patient has no emotional concerns.  The baby is well and being fed    OBJECTIVE:  BP 94/52   Pulse 67   Temp 98  F (36.7  C) (Oral)   Resp 18   Ht 1.524 m (5')   Wt 69.9 kg (154 lb)   LMP  (LMP Unknown)   SpO2 99%   Breastfeeding Yes   BMI 30.08 kg/m      Fundus firm  Incision- dressing dry   Ext- nontender      Lab  Hemoglobin   Date Value Ref Range Status   01/25/2022 8.4 (L) 11.7 - 15.7 g/dL Final   ]        Vesta Benjamin MD  [facility]  [phone]

## 2022-01-26 NOTE — ANESTHESIA PROCEDURE NOTES
Intrathecal injection Procedure Note    Pre-Procedure   Staff -        Anesthesiologist:  eNll Rogers MD       Performed By: anesthesiologist       Location: OR       Procedure Start/Stop Times: 1/25/2022 7:49 PM and 1/25/2022 7:52 PM       Pre-Anesthestic Checklist: patient identified, IV checked, risks and benefits discussed, informed consent, monitors and equipment checked, pre-op evaluation, at physician/surgeon's request and post-op pain management  Timeout:       Correct Patient: Yes        Correct Procedure: Yes        Correct Site: Yes        Correct Position: Yes   Procedure Documentation  Procedure: intrathecal injection       Patient Prep/Sterile Barriers: sterile gloves, mask, patient draped       Skin prep: Chloraprep       Insertion Site: L2-3. (midline approach).       Needle Gauge: 25.        Needle Length (Inches): 3        Spinal Needle Type: Pencan       Introducer used       Introducer: 20 G       # of attempts: 1 and  # of redirects:  0    Assessment/Narrative         Paresthesias: No.       Sensory Level: T4       CSF fluid: clear.      Opening pressure was cmH2O while  Sitting.      Medication(s) Administered   0.75% Hyperbaric Bupivacaine (Intrathecal), 1.4 mL  Fentanyl PF (Intrathecal), 15 mcg  Morphine PF 1 mg/mL (Intrathecal), 0.15 mg  Medication Administration Time: 1/25/2022 7:52 PM

## 2022-01-26 NOTE — ANESTHESIA POSTPROCEDURE EVALUATION
Patient: Ninoska Clemons    Procedure: Procedure(s):   SECTION       Diagnosis:* No pre-op diagnosis entered *  Diagnosis Additional Information: No value filed.    Anesthesia Type:  Spinal    Note:  Disposition: Inpatient   Postop Pain Control: Uneventful            Sign Out: Well controlled pain   PONV: No   Neuro/Psych: Uneventful            Sign Out: Acceptable/Baseline neuro status   Airway/Respiratory: Uneventful            Sign Out: Acceptable/Baseline resp. status   CV/Hemodynamics: Uneventful            Sign Out: Acceptable CV status; No obvious hypovolemia; No obvious fluid overload   Other NRE: NONE   DID A NON-ROUTINE EVENT OCCUR? No    Event details/Postop Comments:  Neuraxial is wearing off appropriately. Pain well-controlled. Patient denies any headache.            Last vitals:  Vitals Value Taken Time   BP 94/52 22 0753   Temp 36.7  C (98  F) 22 0753   Pulse 67 22 0753   Resp 18 22 0753   SpO2 99 % 22 0753       Electronically Signed By: Milagros Ac MD  2022  8:38 AM
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
## 2022-01-26 NOTE — ANESTHESIA PREPROCEDURE EVALUATION
Anesthesia Pre-Procedure Evaluation    Patient: Ninoska Clemons   MRN: [id number] : 1990        Preoperative Diagnosis: * No pre-op diagnosis entered *    Procedure : Procedure(s):   SECTION          Past Medical History:   Diagnosis Date     Anemia due to blood loss, acute 2016    2U PRBC postpartum     Chlamydia       Past Surgical History:   Procedure Laterality Date      SECTION N/A 2016    Primary LTCS for recurrent late fetal heart rate decels      No Known Allergies   Social History     Tobacco Use     Smoking status: Passive Smoke Exposure - Never Smoker     Smokeless tobacco: Current User     Types: Chew     Tobacco comment:  outside- Pt chews tobacco   Substance Use Topics     Alcohol use: No      Wt Readings from Last 1 Encounters:   22 69.9 kg (154 lb)        Anesthesia Evaluation   Pt has had prior anesthetic. Type: OB Labor Epidural.    No history of anesthetic complications       ROS/MED HX  ENT/Pulmonary:    (-) asthma   Neurologic:  - neg neurologic ROS     Cardiovascular:       METS/Exercise Tolerance:     Hematologic:     (+) no thrombocytopenia, anemia (Hgb 9.1), history of blood transfusion (after prior LTCS - ? PPH),     Musculoskeletal:       GI/Hepatic:       Renal/Genitourinary:       Endo:       Psychiatric/Substance Use:  - neg psychiatric ROS     Infectious Disease:       Malignancy:       Other:   , minimal prenatal care, post-dates at 41w3d requesting repeat LTCS   (+) , previous  (x1),  (-) placenta previa       Physical Exam    Airway        Mallampati: IV   TM distance: < 3 FB   Neck ROM: full   Mouth opening: < 3 cm    Respiratory Devices and Support         Dental           Cardiovascular             Pulmonary                   OUTSIDE LABS:  CBC:   Lab Results   Component Value Date    WBC 9.6 2022    WBC 13.1 (H) 2021    HGB 9.1 (L) 2022    HGB 10.9 (L) 2021    HCT 30.0 (L) 2022    HCT 33.9 (L)  09/24/2021     01/25/2022     09/24/2021     BMP:   Lab Results   Component Value Date     (L) 01/11/2021    POTASSIUM 3.1 (L) 01/11/2021    CHLORIDE 104 01/11/2021    CO2 24 01/11/2021    BUN 6 (L) 01/11/2021    CR 0.83 01/11/2021     01/11/2021     COAGS:   Lab Results   Component Value Date    INR 1.04 01/25/2022     POC:   Lab Results   Component Value Date    HCG Positive (A) 09/20/2021     HEPATIC:   Lab Results   Component Value Date    ALBUMIN 3.3 (L) 01/11/2021    PROTTOTAL 6.8 01/11/2021    ALT 46 (H) 01/11/2021    AST 31 01/11/2021    ALKPHOS 94 01/11/2021    BILITOTAL 0.5 01/11/2021     OTHER:   Lab Results   Component Value Date    A1C 5.0 01/25/2022    PETE 8.2 (L) 01/11/2021    LIPASE 28 01/11/2021       Anesthesia Plan    ASA Status:  3      Anesthesia Type: Spinal.              Consents    Anesthesia Plan(s) and associated risks, benefits, and realistic alternatives discussed. Questions answered and patient/representative(s) expressed understanding.    - Discussed:     - Discussed with:  Patient         Postoperative Care            Comments:    Other Comments: SAB - Fentanyl 15mcg, Duramorph 150mcg  Zofran pre SAB  Toradol 15mg at case closure if cleared with surgeon  Phenylephrine gtt  Potentially difficult airway if GETA - glidescope available       neg OB ROS.       Nell Ha MD

## 2022-01-26 NOTE — PROGRESS NOTES
Progress Note    OB Complications:  Limited prenatal care - 1 visit  Desires to adopt baby to aunt  Prior C/S x 1  Post term pregnancy  Anemia - Hgb 9.1  H/o post partum hemorrhage requiring 2 units of RBCs    Doing well. No complaints.     Temp:  [98.1  F (36.7  C)] 98.1  F (36.7  C)  Pulse:  [108] 108  Resp:  [18] 18  BP: (121)/(86) 121/86    NAD, AAO x 3  Abdomen soft, non tender  No c/c/e    A/P: 31 yo  at 41w 2d presented with request for C/S  -- Prolonged decel - Will proceed with C/S tonight  -- Desire tubal ligation - has not signed consent so unable to perform today. I did sign the consent tonight. I described the procedure is permanent and irreversible. Questions answered  -- Will give TXA given h/o tubal ligation    Dorcas Wynne MD  (P) [phone]

## 2022-01-26 NOTE — PROGRESS NOTES
Spoke with Claudia from social work, she will be back to see pt, Claudia has calls to risk management and adoption agency.

## 2022-01-26 NOTE — PLAN OF CARE
Problem: Adult Inpatient Plan of Care  Goal: Plan of Care Review  Outcome: Improving  Flowsheets (Taken 2022 1453)  Plan of Care Reviewed With: patient     Problem: Bleeding (Postpartum  Delivery)  Goal: Hemostasis  Outcome: Improving     Problem: Pain (Postpartum  Delivery)  Goal: Acceptable Pain Control  Outcome: Improving  Intervention: Prevent or Manage Pain  Recent Flowsheet Documentation  Taken 2022 0753 by Chritsa Najera, RN  Pain Management Interventions: medication (see MAR)   Patients pain is well controled using oxycodone, is slightly unsteady when standing up from bed but recovers quickly there for encourage SBA with standing. Hgb is 7.2 threshold to call dr was below 7.  Patient is being followed by .

## 2022-01-26 NOTE — OP NOTE
Section Operative Report    Preoperative Diagnosis:   Prior C/S x 1  Limited prenatal care    Postoperative Diagnosis:  Prior C/S x 1  Limited prenatal care    Procedure: Primary Low transverse  section      Uterine Extensions: None    Surgeon:  Dorcas Wynne MD     Assistant: Dr. Morton    Anesthesia: spinal with Tap Block    Delivery QBL (mL): 1334     Uterine Atony: Present - TXA, Methergine, and 1 dose of Hemabate given    Findings:   1. Amniotic fluid - Moderate Mec  2. Cephalic presentation  3. Normal uterus, tubes and ovaries bilaterally  4. Live male infant  5. Apgars of 8 at one minute, 9 at 5 minutes  6. Weight - 8 Ibs 11 oz (3940 g)  7. Normal appearing placenta  8. Good hemostasis  9. Counts correct x 2  10. Adhesions of bladder to anterior abdominal wall    Drains: Barahona catheter.  Pathology: None  Complications: None    Procedure:    Patient was met preoperatively where we discussed the procedure and the risks associated with the procedure.  She understood these to include but not limited to injury to adjacent organs including bowel, bladder, ureter, infection and bleeding. Understanding these risks her consents were signed.      She was brought to the operating room in stable condition.  After induction of a spinal anesthetic, fetal heart tones were checked and were stable. A barahona catheter was placed. She was carefully prepped and draped in the typical sterile fashion for the procedure.  A timeout was then performed.      A Pfannenstiel skin incision was made and carried down to the rectus fascia which was incised in the midline and carried out bilaterally.  The superior and inferior aspects of the rectus fascia were elevated up and the underlying rectus muscles dissected off with sharp and blunt techniques.  The rectus muscles were then  at the midline and the peritoneum was identified and entered bluntly.  This incision was extended.  A bladder blade was introduced.  The vesicouterine peritoneum was identified and a bladder flap was created. There were adhesions of the bladder to the anterior wall these were taken down.  A low transverse uterine incision was made revealing moderately meconium stained amniotic fluid. A flat kiwi vacuum was placed. Suction was obtained in the green zone. There was 1 pull of the vacuum. The peritoneum was tight. This was taken down with a emy. The kiwi vacuum was again placed, suction obtained in the green zone and the head delivered. There were no pop offs. Vacuum time was approximately 20 seconds.The remainder of the infant delivered.  There was a spontaneous cry and therefore bulb suction was performed. The cord was clamped x 2 and cut and the infant handed off to waiting nursing personnel.    The placenta was then manually removed from the uterus.  The uterus was exteriorized, covered with a moist laparotomy sponge and cleared of all clots and debris.  The uterine incision was then closed with #1-chromic from both angles in a running locking fashion. There was an area on the right with a defect in the hysterotomy. The bladder was carefully taken down. The angle identified and the right side was closed in a running locking fashion with a #1 chromic. The angle on the right was bleeding. Approximately 3 figure of eight stiches were placed. The left side was inspected. There was some bleeding. Approximately 3 figure of eight stiches were placed and the hysterotomy was hemostatic. Surgicel powder was placed. The uterus was returned to the abdominopelvic cavity.The uterine incision was again inspected and noted to be hemostatic. The rectus muscles were made hemostatic with the use of electrocautery and brought together with vertical mattress sutures of 2.0 chromic. The fascia was brought together with 0-PDS from both angles in a running nonlocking fashion and met at the midline. The subcutaneous tissues were irrigated, made hemostatic with use of  electrocautery and brought together with 3-0 plain gut suture.  Skin was closed with 4.0 monocryl .  Patient tolerated this procedure well.  Needle, instrument and lap counts were correct x two.    Dr. Morton's assistance was requested 2/2 prior C/S.     EBL of 1300 ml was secondary to atony and bleeding from the hysterotomy.    Dorcas Wynne MD     CC: Dr. Morton

## 2022-01-27 LAB — HGB BLD-MCNC: 6.3 G/DL (ref 11.7–15.7)

## 2022-01-27 PROCEDURE — 120N000001 HC R&B MED SURG/OB

## 2022-01-27 PROCEDURE — 36415 COLL VENOUS BLD VENIPUNCTURE: CPT | Performed by: OBSTETRICS & GYNECOLOGY

## 2022-01-27 PROCEDURE — 85018 HEMOGLOBIN: CPT | Performed by: OBSTETRICS & GYNECOLOGY

## 2022-01-27 PROCEDURE — 250N000013 HC RX MED GY IP 250 OP 250 PS 637: Performed by: OBSTETRICS & GYNECOLOGY

## 2022-01-27 PROCEDURE — 250N000011 HC RX IP 250 OP 636: Performed by: OBSTETRICS & GYNECOLOGY

## 2022-01-27 PROCEDURE — 258N000003 HC RX IP 258 OP 636: Performed by: OBSTETRICS & GYNECOLOGY

## 2022-01-27 RX ORDER — ACETAMINOPHEN 500 MG
500-1000 TABLET ORAL EVERY 6 HOURS PRN
Qty: 60 TABLET | Refills: 0 | Status: SHIPPED | OUTPATIENT
Start: 2022-01-27 | End: 2022-03-21

## 2022-01-27 RX ORDER — AMOXICILLIN 250 MG
1 CAPSULE ORAL 2 TIMES DAILY
Qty: 14 TABLET | Refills: 0 | Status: SHIPPED | OUTPATIENT
Start: 2022-01-27 | End: 2022-01-27

## 2022-01-27 RX ORDER — AMOXICILLIN 250 MG
1 CAPSULE ORAL 2 TIMES DAILY
Qty: 14 TABLET | Refills: 0 | Status: SHIPPED | OUTPATIENT
Start: 2022-01-27 | End: 2022-03-21

## 2022-01-27 RX ORDER — DIPHENHYDRAMINE HYDROCHLORIDE 50 MG/ML
50 INJECTION INTRAMUSCULAR; INTRAVENOUS
Status: DISCONTINUED | OUTPATIENT
Start: 2022-01-27 | End: 2022-01-28 | Stop reason: HOSPADM

## 2022-01-27 RX ORDER — IBUPROFEN 800 MG/1
800 TABLET, FILM COATED ORAL EVERY 6 HOURS
Qty: 40 TABLET | Refills: 0 | Status: SHIPPED | OUTPATIENT
Start: 2022-01-27 | End: 2022-02-08

## 2022-01-27 RX ORDER — METHYLPREDNISOLONE SODIUM SUCCINATE 125 MG/2ML
125 INJECTION, POWDER, LYOPHILIZED, FOR SOLUTION INTRAMUSCULAR; INTRAVENOUS
Status: DISCONTINUED | OUTPATIENT
Start: 2022-01-27 | End: 2022-01-28 | Stop reason: HOSPADM

## 2022-01-27 RX ORDER — OXYCODONE HYDROCHLORIDE 5 MG/1
5 TABLET ORAL EVERY 4 HOURS PRN
Qty: 12 TABLET | Refills: 0 | Status: SHIPPED | OUTPATIENT
Start: 2022-01-27 | End: 2022-03-21

## 2022-01-27 RX ADMIN — IBUPROFEN 800 MG: 800 TABLET, FILM COATED ORAL at 04:34

## 2022-01-27 RX ADMIN — IBUPROFEN 800 MG: 800 TABLET, FILM COATED ORAL at 18:19

## 2022-01-27 RX ADMIN — SODIUM CHLORIDE 25 MG: 9 INJECTION, SOLUTION INTRAVENOUS at 08:50

## 2022-01-27 RX ADMIN — OXYCODONE HYDROCHLORIDE 5 MG: 5 TABLET ORAL at 01:55

## 2022-01-27 RX ADMIN — ACETAMINOPHEN 975 MG: 325 TABLET ORAL at 14:27

## 2022-01-27 RX ADMIN — OXYCODONE HYDROCHLORIDE 5 MG: 5 TABLET ORAL at 08:10

## 2022-01-27 RX ADMIN — ACETAMINOPHEN 975 MG: 325 TABLET ORAL at 21:08

## 2022-01-27 RX ADMIN — ACETAMINOPHEN 975 MG: 325 TABLET ORAL at 06:01

## 2022-01-27 RX ADMIN — OXYCODONE HYDROCHLORIDE 5 MG: 5 TABLET ORAL at 18:19

## 2022-01-27 RX ADMIN — IBUPROFEN 800 MG: 800 TABLET, FILM COATED ORAL at 11:09

## 2022-01-27 RX ADMIN — SODIUM CHLORIDE 1000 MG: 9 INJECTION, SOLUTION INTRAVENOUS at 11:46

## 2022-01-27 RX ADMIN — DOCUSATE SODIUM 50 MG AND SENNOSIDES 8.6 MG 2 TABLET: 8.6; 5 TABLET, FILM COATED ORAL at 08:10

## 2022-01-27 RX ADMIN — DOCUSATE SODIUM 50 MG AND SENNOSIDES 8.6 MG 1 TABLET: 8.6; 5 TABLET, FILM COATED ORAL at 21:08

## 2022-01-27 NOTE — PLAN OF CARE
Problem: Bleeding (Postpartum  Delivery)  Goal: Hemostasis  Outcome: Improving   Lochia rubra and light, no clots noted. Incision/dressing c/d/i. VSS. Hgb 7.2 yesterday, pt did report earlier in shift she feels dizzy if she tries to get up too fast. Encouraged pt to call if she needs to ambulate to bathroom. Writer escorted pt to bathroom x3 during the night and she denied dizziness. Hgb recheck in AM, call MD if <7 per prior RN note.    Problem: Pain (Postpartum  Delivery)  Goal: Acceptable Pain Control  Outcome: Improving  Intervention: Prevent or Manage Pain  Recent Flowsheet Documentation  Taken 2022 0155 by Toya Swenson RN  Pain Management Interventions:    medication (see MAR)    emotional support    rest  Taken 2022 2332 by Toya Swenson, RN  Pain Management Interventions:    declines    emotional support    rest  Taken 2022 2130 by Toya Swenson, RN  Pain Management Interventions:    medication (see MAR)    emotional support    rest   Pt refused pain medications at beginning of shift, then called out and reported severe pain. PRN Oxy given and effective. Since then pt taking scheduled pain meds. Will continue to monitor.

## 2022-01-27 NOTE — PROVIDER NOTIFICATION
Notified MD that pt's Hgb this AM was 6.3. Pt c/o dizziness yesterday during the day but denied it during the night when writer ambulated with pt to/from bathroom. MD aware and on unit rounding and going to pt room soon to speak with pt regarding plan of care and possible blood transfusion.

## 2022-01-27 NOTE — CONSULTS
"SW continuing to follow for disposition and assistance with planning for the baby. MARTHA met with pt and her aunt, Melva Urrutia, at pts bedside utilizing Stephanie  on [employer]. Discussed plan as outlined by MARTHA yesterday that pt plans to have her aunt care for the pt at time of discharge from the hospital. Pt and Melva both in agreement with this plan. MARTHA discussed forms required by the hospital to do this as reviewed by MARTHA yesterday with risk management department. MARTHA went through the forms with them including the release of information giving the hospital permission to release information to Melva and infant discharge authorization to person other than birth mother. Pt signed both forms. MARTHA discussed need to use notary for the Designation of Standby or Temporary  form.     SW discussed needs of pt after this hospitalization. Pt denies safety concerns about returning home and tells SW she has \"no concerns\" about her safety. Discussed having someone to talk with, such as a counselor. Pt unsure but reports she may be open to this at a later time. She was agreeable to MARTHA making a referral to the clinic SW to follow up with her at a later date about this. Pt confirms plan to follow up at the [facility]. Also discussed doing a referral to an adoption agency to have them reach out to pt to discuss adoption options. Pt agreeable. MARTHA reported plan to get a release of information for her to sign regarding contacting the adoption agency. She reports understanding.    MARTHA discussed needs with Melva. She reports they do not have a car seat or clothes/diapers. She reports they do have a crib or bassinet at home. MARTHA informed her that SW unable to provide a car seat, asked if she can get one from someone or purchase one. She reports that she can do this. SW offered to provide some diapers and clothes. She provides the following information for her address and phone: [address], [address], [place], [place] " [address], [phone]. She also confirms plan for follow up at [facility] for the baby.    TO updated the outreach RN on Melva's address and phone and request for supplies. TO reached out to [facility] and spoke with Junie ([phone]). She reports they can speak with pt with an  to discuss adoption process. She requests additional information and SW informed her of plan to get release of information prior to that. She reports understanding.    TO met back with pt utilizing Stephanie . She signed releases of information for [facility] for herself and the baby. She confirms her phone number is [phone]. TO read the Designation of Standby or Temporary  form to pt and Melva. Discussed that if signed it gives Melva the ability to make medical decisions for the baby for 60 days but then a more permanent plan needs to be established. Pt reports clearly multiple times to TO that Melva will be the guardian of the baby and she does not want to make decisions about this. To explained they will need to pursue a permanent assisted option through an adoption agency or the courts. She reports understanding. Pt signed form which was notarized. Melva signed form utilizing , [place], with [employer].     Melva reports understanding of plan for baby to discharge later today. She reports she will go to get a car seat and return. TO informed her she will need to bring her ID for the discharge. She reports understanding. TO placed Infant Discharge Authorization on pts chart and informed RN that it will need to be completed at time of discharge by checking Melva's ID to confirm her identity and having her sign and the RN witnessing. RN reports understanding.     TO left VM for Junie at [facility] requesting call back with fax number to send releases of information and pt contact information. TO emailed  Designation of Standby or Temporary  form to [employer] to be added to baby's medical record. This form also placed on pts chart. SW made clinic care coordination referrals for both pt and baby.    KAYLEN Umana on 1/27/2022 at 1:21 PM    Addendum: SW spoke with Junie at [facility]. She reports that given that Melva is an extended family relative that they would recommend the family speak with an  regarding options as through their agency they would need to do a home study which would be quite expensive. She did discuss having an  possible come to the hospital to meet with the pt today if she is able to locate someone. SW informed her of plan to offer this to pt. She reports understanding. She reported uncertainty about which attorneys to use. SW reached out to [facility], [employer] who report that they do not assist with adoptions. They did not have other agencies to recommend.     SW met with pt and informed her of the recommendation to speak with an . She reports understanding and was agreeable to meeting with  if possible. She was thankful for the care provided.     MARTHA received call back from Junie who reports that they will not be able to send a worker and she forwarded a list of adoption attorneys for SW to provide to pt. SW also updated risk management. MARTHA sent message and left VM for clinic SW requesting call back.     MARTHA reviewed the form that needs to be signed with evening nurse. MARTHA met with pt and updated her that  would not be coming in and that they had recommended speaking with an . MARTHA provided  list to pt for review and informed her of plan to update clinic SW on this as well so they can potentially assist. Pt reports understanding and denies further questions.    SW left VM handoff for clinic SW.  4:12 PM

## 2022-01-27 NOTE — PROGRESS NOTES
POSTPARTUM DAY # 2/       Subjective:  The patient reports she feels well.  States she ambulated to the bathroom at 0600 without dizziness.  Her biggest concern is her pain. Patient has transitioned to oral pain medications. She is voiding after catheter removal and is starting to pass gas.The amount and color of the lochia is appropriate for the duration of recovery, patient denies clots. The baby has been given to her aunt for adoption    Objective       Exam:   BP (!) 85/52   Pulse 90   Temp 98.4  F (36.9  C) (Oral)   Resp 20   Ht 1.524 m (5')   Wt 69.9 kg (154 lb)   LMP  (LMP Unknown)   SpO2 96%   Breastfeeding Yes   BMI 30.08 kg/m      General: NAD  Resp: Nonlabored  Abdomen: soft, NT  Fundus: firm, nontender  Incision: C/D/I, covered  Ext: no pain     Labs  Hgb 6.3    Impression:   Normal postpartum course. POD#2, repeat LTCS  Acute blood loss anemia    Plan:  Acute blood loss anemia   - Reviewed finding of severe anemia with Aeh. I explained that with the dizziness she experienced yesterday, low blood pressure, and tachycardia that she has today I strongly recommend a blood transfusion.  She declines.  I explained risks of anemia including lightheadedness, fainting, and shortness of breath as well as risks as severe as MI and stroke. I explained these risks using a Stephanie  with RN at bedside. She understands these risks and declines blood transfusion.  I offered IV iron which she accepts.   - Start IV iron infusion today.  Discussed risk of allergic reaction to IV iron.     POSTOPERATIVE   - Continue current care.  - Doing well        Hayley Hernandez MD

## 2022-01-27 NOTE — PROGRESS NOTES
0850: IV iron test dose started at this time. See mar.     0917: Test dose complete at this time.

## 2022-01-28 VITALS
SYSTOLIC BLOOD PRESSURE: 101 MMHG | RESPIRATION RATE: 18 BRPM | OXYGEN SATURATION: 100 % | BODY MASS INDEX: 30.23 KG/M2 | HEART RATE: 88 BPM | HEIGHT: 60 IN | TEMPERATURE: 98.4 F | WEIGHT: 154 LBS | DIASTOLIC BLOOD PRESSURE: 64 MMHG

## 2022-01-28 LAB
HEMOGLOBIN A2 QUANTITATION: 6.1 % (ref 2.2–3.5)
HEMOGLOBIN ELECTROPHRESIS: ABNORMAL
HEMOGLOBIN F QUANTITATION: 1.7 % (ref 0–2)
HGB BLD-MCNC: 6.9 G/DL (ref 11.7–15.7)
PATH ICD:: ABNORMAL
REVIEWING PATHOLOGIST: ABNORMAL

## 2022-01-28 PROCEDURE — 36415 COLL VENOUS BLD VENIPUNCTURE: CPT | Performed by: OBSTETRICS & GYNECOLOGY

## 2022-01-28 PROCEDURE — 250N000011 HC RX IP 250 OP 636: Performed by: OBSTETRICS & GYNECOLOGY

## 2022-01-28 PROCEDURE — 250N000013 HC RX MED GY IP 250 OP 250 PS 637: Performed by: OBSTETRICS & GYNECOLOGY

## 2022-01-28 PROCEDURE — 85018 HEMOGLOBIN: CPT | Performed by: OBSTETRICS & GYNECOLOGY

## 2022-01-28 RX ORDER — DIPHENHYDRAMINE HYDROCHLORIDE 50 MG/ML
25 INJECTION INTRAMUSCULAR; INTRAVENOUS EVERY 6 HOURS PRN
Status: DISCONTINUED | OUTPATIENT
Start: 2022-01-28 | End: 2022-01-28 | Stop reason: HOSPADM

## 2022-01-28 RX ADMIN — ACETAMINOPHEN 975 MG: 325 TABLET ORAL at 02:56

## 2022-01-28 RX ADMIN — DIPHENHYDRAMINE HYDROCHLORIDE 25 MG: 50 INJECTION, SOLUTION INTRAMUSCULAR; INTRAVENOUS at 10:05

## 2022-01-28 RX ADMIN — IBUPROFEN 800 MG: 800 TABLET, FILM COATED ORAL at 09:03

## 2022-01-28 RX ADMIN — ACETAMINOPHEN 975 MG: 325 TABLET ORAL at 09:03

## 2022-01-28 RX ADMIN — IBUPROFEN 800 MG: 800 TABLET, FILM COATED ORAL at 02:55

## 2022-01-28 RX ADMIN — DOCUSATE SODIUM 50 MG AND SENNOSIDES 8.6 MG 1 TABLET: 8.6; 5 TABLET, FILM COATED ORAL at 09:03

## 2022-01-28 NOTE — PLAN OF CARE
Problem: Adult Inpatient Plan of Care  Goal: Plan of Care Review  Outcome: Completed  Flowsheets (Taken 2022 1244)  Plan of Care Reviewed With: patient  Outcome Summary: Adequate for discharge.  Goal: Patient-Specific Goal (Individualized)  Outcome: Completed  Goal: Absence of Hospital-Acquired Illness or Injury  Outcome: Completed  Intervention: Prevent Skin Injury  Recent Flowsheet Documentation  Taken 2022 0730 by Jerilyn Diaz RN  Body Position: supine  Goal: Optimal Comfort and Wellbeing  Outcome: Completed  Goal: Readiness for Transition of Care  Outcome: Completed     Problem: Adjustment to Role Transition (Postpartum  Delivery)  Goal: Successful Maternal Role Transition  Outcome: Completed     Problem: Bleeding (Postpartum  Delivery)  Goal: Hemostasis  Outcome: Completed     Problem: Infection (Postpartum  Delivery)  Goal: Absence of Infection Signs and Symptoms  Outcome: Completed     Problem: Pain (Postpartum  Delivery)  Goal: Acceptable Pain Control  Outcome: Completed     Problem: Postoperative Nausea and Vomiting (Postpartum  Delivery)  Goal: Nausea and Vomiting Relief  Outcome: Completed     Problem: Postoperative Urinary Retention (Postpartum  Delivery)  Goal: Effective Urinary Elimination  Outcome: Completed   Adequate for discharge.

## 2022-01-28 NOTE — PROGRESS NOTES
IV Iron infusion complete this evening for hgb 6.3. Pt asymptomatic.  Pt desires to stay overnight. Dr. Ortiz updated. D/C'd discharge order. No new orders at this time. Will place update MD if pt becomes symptomatic.

## 2022-01-28 NOTE — PLAN OF CARE
"  Problem: Pain (Postpartum  Delivery)  Goal: Acceptable Pain Control  Outcome: Improving     Problem: Adjustment to Role Transition (Postpartum  Delivery)  Goal: Successful Maternal Role Transition  Outcome: Improving   VSS, afebrile and OB checks WNL. Dressing c/d/I. Pt is taking Ibuprofen, Tylenol, and oxy for pain management. IV Iron infusion complete. IV SL. Pt up independent with cares. Pt observed laughing with visitor and reports doing well.   RN observed \"chew\" of unknown substance in pt room and mouth. Education provided on safety use concurrent with pain medications. Pt verbalizes understanding but refused to remove substance. MD aware.   "

## 2022-01-28 NOTE — PROGRESS NOTES
Pt's breast are extremely engorged and painful, gave Tylenol and Ibuprofen, also put on a tight fitting bra and ice.    Informed Dr. Wynne about Hgb of 6.9, no new orders received for that but did get an order for Benadryl to help with engorgement.

## 2022-01-28 NOTE — PLAN OF CARE
Problem: Adult Inpatient Plan of Care  Goal: Plan of Care Review  Outcome: Improving  Flowsheets (Taken 1/28/2022 0432)  Plan of Care Reviewed With: patient  Progress: improving  VSS. Hgb 6.4, still asymptomatic. FF1/U, scant rubra lochia. Incision dressing is clean, dry, and intact. Endorses mild uterine cramping, well-controlled with scheduled ibuprofen and tylenol. C/o painful breasts; palpation relieved firm, full breasts. RN educated pt to hand express/massage breasts and offered hot packs since she does not want to keep producing milk for baby or donation. Pt has had some relief after massage and hot pack use.

## 2022-01-31 PROBLEM — D56.3 BETA THALASSEMIA TRAIT: Status: ACTIVE | Noted: 2022-01-31

## 2022-02-01 ENCOUNTER — PATIENT OUTREACH (OUTPATIENT)
Dept: NURSING | Facility: CLINIC | Age: 32
End: 2022-02-01
Payer: COMMERCIAL

## 2022-02-01 ENCOUNTER — TELEPHONE (OUTPATIENT)
Dept: FAMILY MEDICINE | Facility: CLINIC | Age: 32
End: 2022-02-01

## 2022-02-01 NOTE — TELEPHONE ENCOUNTER
RN called pt with the help of a Stephanie . RN relayed Dr. Morton's message to pt.    Pt verbalizes understanding and would like to make postpartum appt as soon as possible.    RN routing message to [facility] Scheduling & Registration Pool to assist pt in making her 6 week Postpartum appt with Dr. Walker.    JR Summers, RN   [facility]    ----- Message from Shania Morton MD sent at 1/31/2022 10:09 AM CST -----    Please let Aeh She Deonte know:      She has beta thalassemia trait.  This is a genetic condition where the red blood cells are too small and there aren't enough of them.    She should discuss this with her doctor when she goes in for her postpartum check.    Please schedule postpartum check in 2 & 6 weeks with Dr. Walker.

## 2022-02-01 NOTE — PROGRESS NOTES
Clinic Care Coordination Contact  Community Health Worker Initial Outreach    CHW Initial Information Gathering:  Referral Source: PCP  Preferred Hospital: [facility]  [phone]  Preferred Urgent Care: [facility] [place], [phone]  Current living arrangement:: I live in a private home with family  Type of residence:: Apartment  Community Resources: None  Supplies used at home:: None  Equipment Currently Used at Home: none  Informal Support system:: Family  No PCP office visit in Past Year: No  Transportation means:: Friend  CHW Additional Questions  If ED/Hospital discharge, follow-up appointment scheduled as recommended?: N/A  Medication changes made following ED/Hospital discharge?: N/A  MyChart active?: No  Patient agreeable to assistance with activating MyChart?: No    Patient accepts CC: Yes. Patient scheduled for assessment with CCC MARTHA on 2/14/2022 at 2:00 PM. Patient noted desire to discuss Aeh Deonte is giving birth tomorrow. She wants to give her baby away (to a relative?). Please offer adoption resources, postpartum depression, domestic partner violence resources..     CHW also assisted patient to follow up with OB doctor for 2 weeks follow up 6 weeks postpartum follow up with OB.

## 2022-02-08 ENCOUNTER — OFFICE VISIT (OUTPATIENT)
Dept: FAMILY MEDICINE | Facility: CLINIC | Age: 32
End: 2022-02-08
Payer: COMMERCIAL

## 2022-02-08 VITALS
HEART RATE: 106 BPM | RESPIRATION RATE: 16 BRPM | HEIGHT: 60 IN | WEIGHT: 150 LBS | TEMPERATURE: 99.2 F | OXYGEN SATURATION: 98 % | DIASTOLIC BLOOD PRESSURE: 70 MMHG | BODY MASS INDEX: 29.45 KG/M2 | SYSTOLIC BLOOD PRESSURE: 102 MMHG

## 2022-02-08 DIAGNOSIS — D62 ANEMIA DUE TO BLOOD LOSS, ACUTE: ICD-10-CM

## 2022-02-08 DIAGNOSIS — Z01.818 TUBAL LIGATION EVALUATION: Primary | ICD-10-CM

## 2022-02-08 LAB — HGB BLD-MCNC: 9 G/DL (ref 11.7–15.7)

## 2022-02-08 PROCEDURE — 99024 POSTOP FOLLOW-UP VISIT: CPT | Performed by: FAMILY MEDICINE

## 2022-02-08 PROCEDURE — 85018 HEMOGLOBIN: CPT | Performed by: FAMILY MEDICINE

## 2022-02-08 PROCEDURE — 90686 IIV4 VACC NO PRSV 0.5 ML IM: CPT | Performed by: FAMILY MEDICINE

## 2022-02-08 PROCEDURE — 36415 COLL VENOUS BLD VENIPUNCTURE: CPT | Performed by: FAMILY MEDICINE

## 2022-02-08 PROCEDURE — 90471 IMMUNIZATION ADMIN: CPT | Performed by: FAMILY MEDICINE

## 2022-02-08 RX ORDER — FERROUS SULFATE 325(65) MG
325 TABLET ORAL
Qty: 90 TABLET | Refills: 1 | Status: SHIPPED | OUTPATIENT
Start: 2022-02-08 | End: 2022-03-21

## 2022-02-08 RX ORDER — IBUPROFEN 800 MG/1
800 TABLET, FILM COATED ORAL EVERY 6 HOURS
Qty: 40 TABLET | Refills: 0 | Status: SHIPPED | OUTPATIENT
Start: 2022-02-08 | End: 2022-03-21

## 2022-02-08 RX ORDER — OXYCODONE HYDROCHLORIDE 5 MG/1
5 TABLET ORAL EVERY 4 HOURS PRN
Qty: 12 TABLET | Refills: 0 | Status: CANCELLED | OUTPATIENT
Start: 2022-02-08

## 2022-02-08 ASSESSMENT — MIFFLIN-ST. JEOR: SCORE: 1311.9

## 2022-02-08 NOTE — PROGRESS NOTES
ASSESSMENT/PLAN:   Ninoska was seen today for follow up.    Diagnoses and all orders for this visit:    Tubal ligation evaluation  -     Ob/Gyn Referral; Future  We will call over to [facility] today to see if she needs a consult, or if there is able to get her set up for her tubal ligation since they saw her in the hospital and recently did her .  She did sign her papers for tubal ligation while she was at the hospital for her  section.  She is very clear that she does not want another child     delivery delivered  -     ibuprofen (ADVIL/MOTRIN) 800 MG tablet; Take 1 tablet (800 mg) by mouth every 6 hours  -     Ob/Gyn Referral; Future    scar looks good today.  Steri-Strips are still in place.  I did advise her not to scratch at the area even though it itches a little bit.  We talked about other modalities for helping to deal with the itching.   Follow-up here in 4 weeks to make sure that everything has been taking care of with regard to her tubal ligation, as well as to answer questions about the adoption process.  Her child has been referred to care coordinators and social work to assist with the adoption process    Anemia due to blood loss, acute   Encouraged a high iron diet.   Reviewed use of iron  -     Hemoglobin; Future  -     ferrous sulfate (FEROSUL) 325 (65 Fe) MG tablet; Take 1 tablet (325 mg) by mouth daily (with breakfast)    Other orders  -     INFLUENZA VACCINE IM >6 MO VALENT IIV4 (AFLURIA/FLUZONE)          No follow-ups on file.       ======================================================    SUBJECTIVE  Ninoska Clemons is a 32 year old female here for   1.  POST operative from c- section.  Bleeding has stopped.  She has some itching where her steri strips are.    She says things at home are going ok.  She has questions about whether or not she has had a tubal ligation.  She did sign the papers, but at the hospital.    No depression or sadness.    She denies any  violence.    She says her milk is decreasing.    No problems controlling her bowel or her bladder.    Her appetite is good . She is sleeping ok.      Hemoglobin was 6.9 when she left the hospital.  Since she has been home, she has not felt any dizziness.  No sobr.  No palpitations.      She gave the baby to her aunt to adopt.  She brings in the paperwork today for the baby's birth certificate.      ROS  Complete 10 point review of systems negative except as noted above in HPI      OBJECTIVE  /70 (BP Location: Left arm, Patient Position: Sitting, Cuff Size: Adult Regular)   Pulse 106   Temp 99.2  F (37.3  C) (Oral)   Resp 16   Ht 1.524 m (5')   Wt 68 kg (150 lb)   LMP  (LMP Unknown)   SpO2 98%   BMI 29.29 kg/m     Gen:  Nad, alert  Psych: No depression or sadness noted.  Fluent speech.  Good eye contact.  Broad affect.  Rrr, no m/r/g  Clear to auscultation bilaterally.  No wheezes or rhonchi noted.  Abdomen is soft.  Fundus is palpable but nontender.  She still had her outer bandage on.  This was removed today.  Steri-Strips are in place.  No purulence was noted.  The wound appears to be healing well.  No erythema is noted.  No edema is noted.  Current Outpatient Medications   Medication     acetaminophen (TYLENOL) 325 MG tablet     acetaminophen (TYLENOL) 500 MG tablet     ferrous sulfate (FEROSUL) 325 (65 Fe) MG tablet     ibuprofen (ADVIL/MOTRIN) 800 MG tablet     oxyCODONE (ROXICODONE) 5 MG tablet     senna-docusate (SENOKOT-S/PERICOLACE) 8.6-50 MG tablet     No current facility-administered medications for this visit.      Patient Active Problem List   Diagnosis     ASCUS of cervix with negative high risk HPV     Supervision of high-risk pregnancy     Insufficient prenatal care in third trimester     History of  delivery, currently pregnant     Unplanned unwanted pregnancy     Overweight (BMI 25.0-29.9)     Microcytic anemia     Pregnancy      delivery delivered     Beta thalassemia  trait        LABS & IMAGES   No results found for any visits on 02/08/22.      ======================================================    MDM          Options for treatment and follow-up care were reviewed with the patient. Aeh S Deonte and/or guardian was engaged and actively involved in the decision making process. Aeh S Deonte and/or guardian verbalized understanding of the options discussed and was satisfied with the final plan.      Dora Walker MD

## 2022-02-14 ENCOUNTER — PATIENT OUTREACH (OUTPATIENT)
Dept: CARE COORDINATION | Facility: CLINIC | Age: 32
End: 2022-02-14
Payer: COMMERCIAL

## 2022-02-14 DIAGNOSIS — Z65.8 PSYCHOSOCIAL STRESSORS: Primary | ICD-10-CM

## 2022-02-14 DIAGNOSIS — Z64.0 UNPLANNED UNWANTED PREGNANCY: ICD-10-CM

## 2022-02-14 DIAGNOSIS — Z71.89 COMPLEX CARE COORDINATION: ICD-10-CM

## 2022-02-14 DIAGNOSIS — Z60.3 IMPAIRED ABILITY TO USE COMMUNITY RESOURCES DUE TO LANGUAGE BARRIER: ICD-10-CM

## 2022-02-14 SDOH — SOCIAL STABILITY - SOCIAL INSECURITY: ACCULTURATION DIFFICULTY: Z60.3

## 2022-02-14 NOTE — PROGRESS NOTES
Clinic Care Coordination Contact     Pt was a no show for initial CCC assessment w/ SW on 2/14/22. CHW team to re-attempt outreach and offer to reschedule in-person if needed.    Previous no shows: 10/13/21

## 2022-02-16 ENCOUNTER — PATIENT OUTREACH (OUTPATIENT)
Dept: NURSING | Facility: CLINIC | Age: 32
End: 2022-02-16
Payer: COMMERCIAL

## 2022-02-17 NOTE — DISCHARGE SUMMARY
HOSPITAL DISCHARGE SUMMARY - C SECTION     NAME: Ninoska Clemons   : 1990    MRN: [id number]    PCP: Dora Walker    ADMISSION DATE:  2022  DELIVERY DATE: 2022  GESTATIONAL AGE:  41w 2d     DISCHARGE DATE:  2022    REASON FOR ADMISSION: Delivery of baby     DIAGNOSIS:    1.  Birth secondary to repeat C/S  2. Apgars of 8   at 1 minute, 9  at 5 minutes  3. Weight (oz):  138.98  oz.    CONDITIONS COMPLICATION ANTEPARTUM/POSTPARTUM:  Refer to prenatal   Antepartum: insufficient prenatal care, post-term pregnancy, Cat II tracing. Hx of C/S, Anemia, Hx of blood transfusion after prior C/S, Unplanned pregnancy - plan to have aunt care for infant  Intrapartum: None  Postpartum: Acute on chronic anemia    PROCEDURES:  Lower transverse     SIGNIFICANT DIAGNOSTIC PROCEDURES:   None    CONSULTS:   Social work    HISTORY OF PRESENT ILLNESS AND HOSPITAL COURSE: Ninoska Clemons  is a 32 year old  at 41w 2d who presented after limited prenatal care and was noted to be post date with a prolonged deceleration. She has history of prior C/S and the decision was made to move forward with repeat C/S. She underwent a underwent a repeat  section. There was uterine atony. TXA, methergine and Hemabate were given. The QBL was 1334. She had symptomatic acute on chronic anemia. She declined blood transfusion. She did accept an IV iron transfusion. She did meet with social work and received assistance with setting her family member up to care for the child. Patient was asymptomatic at time of discharge. She denied chest pain, shortness of breath or dizziness. Patient denies headache, change in vision or upper abdominal pain.  On the day of discharge patient was tolerating diet, pain was controlled with oral medications, she was voiding and passing gas.    LABS:  Hgb 9.1 --> 8.4 --> 7.2 --> 6.3 --> 6.9    DISPOSITION:  Home    DISCHARGE CONDITION: Good/Stable    DISCHARGE MEDICATIONS:      Review of your  medicines      START taking      Dose / Directions   oxyCODONE 5 MG tablet  Commonly known as: ROXICODONE  Used for:  delivery delivered      Dose: 5 mg  Take 1 tablet (5 mg) by mouth every 4 hours as needed for severe pain  Quantity: 12 tablet  Refills: 0     senna-docusate 8.6-50 MG tablet  Commonly known as: SENOKOT-S/PERICOLACE  Used for: History of  delivery, currently pregnant      Dose: 1 tablet  Take 1 tablet by mouth 2 times daily  Quantity: 14 tablet  Refills: 0        CONTINUE these medicines which may have CHANGED, or have new prescriptions. If we are uncertain of the size of tablets/capsules you have at home, strength may be listed as something that might have changed.      Dose / Directions   * acetaminophen 325 MG tablet  Commonly known as: TYLENOL  This may have changed: Another medication with the same name was added. Make sure you understand how and when to take each.      Dose: 325 mg  Take 325 mg by mouth  Refills: 0     * acetaminophen 500 MG tablet  Commonly known as: TYLENOL  This may have changed: You were already taking a medication with the same name, and this prescription was added. Make sure you understand how and when to take each.  Used for:  delivery delivered      Dose: 500-1,000 mg  Take 1-2 tablets (500-1,000 mg) by mouth every 6 hours as needed  Quantity: 60 tablet  Refills: 0         * This list has 2 medication(s) that are the same as other medications prescribed for you. Read the directions carefully, and ask your doctor or other care provider to review them with you.               Where to get your medicines      These medications were sent to [id number] - [place], [place] - [address], [place] [address]    Phone: [phone]     acetaminophen 500 MG tablet    oxyCODONE 5 MG tablet     These medications were sent to [address] [place] [id number] [facility]  [address],  [facility] [address]    Phone: [phone]     senna-docusate 8.6-50 MG tablet         DISCHARGE PLAN:   - Follow up with  myself, in 1-2 weeks  - Take medication as prescribed  - Physical activity: As tolerated, no heavy lifting. Pelvic rest.  - Diet:  Regular  - Medication:  Please see MAR  - Warning signs discussed with patient about when to call the clinic/hospital  - All questions and concerns were answered for the patient prior to discharge.       Dorcas Wynne MD  (P) [phone]    Physician(s) in addition to primary physician who should receive a copy:  CC1: Dr. Morton

## 2022-02-18 ENCOUNTER — TRANSFERRED RECORDS (OUTPATIENT)
Dept: HEALTH INFORMATION MANAGEMENT | Facility: CLINIC | Age: 32
End: 2022-02-18
Payer: COMMERCIAL

## 2022-02-28 ENCOUNTER — HOSPITAL ENCOUNTER (OUTPATIENT)
Facility: AMBULATORY SURGERY CENTER | Age: 32
End: 2022-02-28
Attending: OBSTETRICS & GYNECOLOGY
Payer: COMMERCIAL

## 2022-02-28 DIAGNOSIS — Z11.59 ENCOUNTER FOR SCREENING FOR OTHER VIRAL DISEASES: Primary | ICD-10-CM

## 2022-03-01 RX ORDER — ACETAMINOPHEN 325 MG/1
975 TABLET ORAL ONCE
Status: CANCELLED | OUTPATIENT
Start: 2022-03-01 | End: 2022-03-01

## 2022-03-07 ENCOUNTER — PATIENT OUTREACH (OUTPATIENT)
Dept: CARE COORDINATION | Facility: CLINIC | Age: 32
End: 2022-03-07
Payer: COMMERCIAL

## 2022-03-07 NOTE — PROGRESS NOTES
Clinic Care Coordination Contact     Pt was a no show for initial CCC assessment w/ SW on 3/7/22. No further outreach will be attempted at this time.     Previous no shows: 10/13/21, 2/14/22

## 2022-03-16 ENCOUNTER — HOSPITAL ENCOUNTER (EMERGENCY)
Facility: HOSPITAL | Age: 32
Discharge: LEFT BEFORE DISCHARGE | End: 2022-03-16
Admitting: EMERGENCY MEDICINE
Payer: COMMERCIAL

## 2022-03-16 VITALS
HEART RATE: 106 BPM | BODY MASS INDEX: 29.29 KG/M2 | TEMPERATURE: 97.5 F | OXYGEN SATURATION: 99 % | SYSTOLIC BLOOD PRESSURE: 105 MMHG | WEIGHT: 150 LBS | RESPIRATION RATE: 12 BRPM | DIASTOLIC BLOOD PRESSURE: 71 MMHG

## 2022-03-16 PROCEDURE — 99281 EMR DPT VST MAYX REQ PHY/QHP: CPT

## 2022-03-16 NOTE — ED TRIAGE NOTES
Pt is here as she has been vaginally bleeding x2 days a lot and she  Feels dizzy. Had a baby in January.  Speaks easton only.

## 2022-03-16 NOTE — ED PROVIDER NOTES
ED Provider In Triage Note  [facility]  Encounter Date: Mar 16, 2022    Chief Complaint   Patient presents with     Vaginal Bleeding       Brief HPI:   Ninoska Clemons is a 32 year old female s/p c section delivery 1/24/22 presenting to the Emergency Department with a chief complaint of heavy vaginal bleeding for 1 week and here with dizziness now for 2 days.  Pt was anemic after pregnancy as low as 6.3, last checked 2/8/22 and improved to 9.    Brief Physical Exam:  /71   Pulse 106   Temp 97.5  F (36.4  C) (Tympanic)   Resp 12   Wt 68 kg (150 lb)   LMP  (LMP Unknown)   SpO2 99%   BMI 29.29 kg/m    General: Non-toxic appearing  HEENT: Atraumatic  Resp: No respiratory distress  Abdomen: Non-peritoneal  Neuro: Alert, oriented, answers questions appropriately  Psych: Behavior appropriate      Plan Initiated in Triage:  No orders of the defined types were placed in this encounter.      PIT Dispo:   Return to lobby while awaiting workup and ED bed availability    Adelfo Shepherd MD on 3/16/2022 at 2:34 PM    Patient was evaluated by the Physician in Triage due to a limitation of available rooms in the Emergency Department. A plan of care was discussed based on the information obtained on the initial evaluation and patient was consuled to return back to the Emergency Department lobby after this initial evalutaiton until results were obtained or a room became available in the Emergency Department. Patient was counseled not to leave prior to receiving the results of their workup.       Adelfo Shepherd MD  03/16/22 [id number]

## 2022-03-21 ENCOUNTER — LAB (OUTPATIENT)
Dept: LAB | Facility: CLINIC | Age: 32
End: 2022-03-21
Attending: OBSTETRICS & GYNECOLOGY
Payer: COMMERCIAL

## 2022-03-21 DIAGNOSIS — Z11.59 ENCOUNTER FOR SCREENING FOR OTHER VIRAL DISEASES: ICD-10-CM

## 2022-03-21 PROCEDURE — U0005 INFEC AGEN DETEC AMPLI PROBE: HCPCS

## 2022-03-21 PROCEDURE — U0003 INFECTIOUS AGENT DETECTION BY NUCLEIC ACID (DNA OR RNA); SEVERE ACUTE RESPIRATORY SYNDROME CORONAVIRUS 2 (SARS-COV-2) (CORONAVIRUS DISEASE [COVID-19]), AMPLIFIED PROBE TECHNIQUE, MAKING USE OF HIGH THROUGHPUT TECHNOLOGIES AS DESCRIBED BY CMS-2020-01-R: HCPCS

## 2022-03-22 LAB — SARS-COV-2 RNA RESP QL NAA+PROBE: NEGATIVE

## 2022-03-23 RX ORDER — MAGNESIUM SULFATE HEPTAHYDRATE 40 MG/ML
4 INJECTION, SOLUTION INTRAVENOUS ONCE
Status: CANCELLED | OUTPATIENT
Start: 2022-03-23 | End: 2022-03-23

## 2022-03-23 RX ORDER — LIDOCAINE 40 MG/G
CREAM TOPICAL
Status: CANCELLED | OUTPATIENT
Start: 2022-03-23

## 2022-03-23 RX ORDER — SODIUM CHLORIDE, SODIUM LACTATE, POTASSIUM CHLORIDE, CALCIUM CHLORIDE 600; 310; 30; 20 MG/100ML; MG/100ML; MG/100ML; MG/100ML
INJECTION, SOLUTION INTRAVENOUS CONTINUOUS
Status: CANCELLED | OUTPATIENT
Start: 2022-03-23

## 2022-05-03 ENCOUNTER — HOSPITAL ENCOUNTER (OUTPATIENT)
Facility: AMBULATORY SURGERY CENTER | Age: 32
End: 2022-05-03
Attending: OBSTETRICS & GYNECOLOGY
Payer: COMMERCIAL

## 2022-05-09 DIAGNOSIS — Z11.59 ENCOUNTER FOR SCREENING FOR OTHER VIRAL DISEASES: Primary | ICD-10-CM

## 2022-05-09 RX ORDER — ACETAMINOPHEN 325 MG/1
975 TABLET ORAL ONCE
Status: CANCELLED | OUTPATIENT
Start: 2022-05-09 | End: 2022-05-09

## 2022-05-12 ENCOUNTER — TELEPHONE (OUTPATIENT)
Dept: FAMILY MEDICINE | Facility: CLINIC | Age: 32
End: 2022-05-12
Payer: COMMERCIAL

## 2022-05-12 NOTE — TELEPHONE ENCOUNTER
Left voice message with mother that [facility] clinic at [phone] is calling re: patient's appointment.  Requested return call if appt needs to be rescheduled.

## 2022-05-25 RX ORDER — SODIUM CHLORIDE, SODIUM LACTATE, POTASSIUM CHLORIDE, CALCIUM CHLORIDE 600; 310; 30; 20 MG/100ML; MG/100ML; MG/100ML; MG/100ML
INJECTION, SOLUTION INTRAVENOUS CONTINUOUS
Status: CANCELLED | OUTPATIENT
Start: 2022-05-25

## 2022-05-25 RX ORDER — LIDOCAINE 40 MG/G
CREAM TOPICAL
Status: CANCELLED | OUTPATIENT
Start: 2022-05-25

## 2022-08-25 ENCOUNTER — OFFICE VISIT (OUTPATIENT)
Dept: FAMILY MEDICINE | Facility: CLINIC | Age: 32
End: 2022-08-25
Payer: COMMERCIAL

## 2022-08-25 ENCOUNTER — PATIENT OUTREACH (OUTPATIENT)
Dept: NURSING | Facility: CLINIC | Age: 32
End: 2022-08-25

## 2022-08-25 VITALS
HEART RATE: 81 BPM | OXYGEN SATURATION: 98 % | DIASTOLIC BLOOD PRESSURE: 60 MMHG | SYSTOLIC BLOOD PRESSURE: 100 MMHG | WEIGHT: 160.8 LBS | HEIGHT: 59 IN | RESPIRATION RATE: 12 BRPM | TEMPERATURE: 98.2 F | BODY MASS INDEX: 32.42 KG/M2

## 2022-08-25 DIAGNOSIS — Z11.1 SCREENING EXAMINATION FOR PULMONARY TUBERCULOSIS: ICD-10-CM

## 2022-08-25 DIAGNOSIS — Z30.09 BIRTH CONTROL COUNSELING: Primary | ICD-10-CM

## 2022-08-25 DIAGNOSIS — Z32.01 PREGNANCY TEST POSITIVE: ICD-10-CM

## 2022-08-25 DIAGNOSIS — Z64.0 UNWANTED PREGNANCY: ICD-10-CM

## 2022-08-25 DIAGNOSIS — Z78.9 NEEDS ASSISTANCE WITH COMMUNITY RESOURCES: ICD-10-CM

## 2022-08-25 LAB — HCG UR QL: POSITIVE

## 2022-08-25 PROCEDURE — 36415 COLL VENOUS BLD VENIPUNCTURE: CPT | Performed by: FAMILY MEDICINE

## 2022-08-25 PROCEDURE — 81025 URINE PREGNANCY TEST: CPT | Performed by: FAMILY MEDICINE

## 2022-08-25 PROCEDURE — 86481 TB AG RESPONSE T-CELL SUSP: CPT | Performed by: FAMILY MEDICINE

## 2022-08-25 PROCEDURE — 99214 OFFICE O/P EST MOD 30 MIN: CPT | Performed by: FAMILY MEDICINE

## 2022-08-25 NOTE — PROGRESS NOTES
"  Assessment & Plan     Birth control counseling  Pregnancy test positive  Unwanted pregnancy  Need Assistance with Community Resources  Patient had an unwanted pregnancy this past year, presented at 24 weeks gestation and does not understand why that situation is different from this one. She is less than 5 weeks pregnant according to sure LMP 7/26/22. [facility]. We can obtain urgent US if needed. CA assisting patient to make appt. Referred to care coordination as well.    - HCG qualitative urine  - HCG qualitative urine  - Primary Care Coordination Referral    Screening examination for pulmonary tuberculosis  Needs for work, works at [employer]. Quant gold last year was negative  -     Quantiferon TB Gold Plus; Future  -     Quantiferon TB Gold Plus    Other orders  -     REVIEW OF HEALTH MAINTENANCE PROTOCOL ORDERS           BMI:   Estimated body mass index is 32.12 kg/m  as calculated from the following:    Height as of this encounter: 1.507 m (4' 11.33\").    Weight as of this encounter: 72.9 kg (160 lb 12.8 oz).   Weight management plan: Patient was referred to their PCP to discuss a diet and exercise plan.        Return in about 4 weeks (around 9/22/2022) for follow up unwanted pregnancy.    30 minutes spent on the date of the encounter doing chart review, history and exam, documentation and further activities per the note    Visit was completed along with Stephanie phone     Options for treatment and follow-up care were reviewed with the patient. Ninoska Clemons and/or guardian was engaged and actively involved in the decision making process. Aeelijah Clemons and/or guardian verbalized understanding of the options discussed and was satisfied with the final plan.    Maria Del Rosario Farley MD  [facility] JARED    Subjective   Krystianh Laura Deonte is a 32 year old, presenting for the following health issues:  Contraception (Wants to restart birth control ) and needs " "TB test for work       HPI     Patient wants to restart birth control today but she is pregnant.   LMP 22, regular periods.   She wants an elective .   Patient is understandably upset, she cancelled and no showed several preops and her tubal ligation surgeries.   She had an unwanted pregnancy last year, presented at 24 weeks gestation and was unable to get an elective  then due to dates.     Patient denies any symptoms of TB including night sweats, unexplained weight loss, shortness of breath, chest pain.  No recent travel outside of the [place], no recent time in long-term.  Discussed the signs and symptoms of TB, when to seek medical attention, went to call clinic to be checked.  Patient is never had a positive TB test in the past.        Review of Systems   Constitutional, HEENT, cardiovascular, pulmonary, gi and gu systems are negative, except as otherwise noted.      Objective    /60   Pulse 81   Temp 98.2  F (36.8  C) (Oral)   Resp 12   Ht 1.507 m (4' 11.33\")   Wt 72.9 kg (160 lb 12.8 oz)   LMP 2022 (Exact Date)   SpO2 98%   BMI 32.12 kg/m    Body mass index is 32.12 kg/m .  Physical Exam   GENERAL: healthy, alert and no distress  EYES: Eyes grossly normal to inspection, PERRL and conjunctivae and sclerae normal  HENT: ear canals and TM's normal, nose and mouth without ulcers or lesions  NECK: no adenopathy, no asymmetry, masses, or scars and thyroid normal to palpation  RESP: lungs clear to auscultation - no rales, rhonchi or wheezes  BREAST: normal without masses, tenderness or nipple discharge and no palpable axillary masses or adenopathy  CV: regular rate and rhythm, normal S1 S2, no S3 or S4, no murmur, click or rub, no peripheral edema and peripheral pulses strong  ABDOMEN: soft, nontender, no hepatosplenomegaly, no masses and bowel sounds normal  MS: no gross musculoskeletal defects noted, no edema  SKIN: no suspicious lesions or rashes  NEURO: Normal strength and tone, " mentation intact and speech normal  PSYCH: mentation appears normal, tearful and anxious    Results for orders placed or performed in visit on 08/25/22 (from the past 24 hour(s))   HCG qualitative urine   Result Value Ref Range    hCG Urine Qualitative Positive (A) Negative   Quantiferon TB Gold Plus    Specimen: Peripheral Blood    Narrative    The following orders were created for panel order Quantiferon TB Gold Plus.  Procedure                               Abnormality         Status                     ---------                               -----------         ------                     Quantiferon TB Gold Plus...[[id number]]                      In process                 Quantiferon TB Gold Plus...[[id number]]                      In process                 Quantiferon TB Gold Plus...[[id number]]                      In process                 Quantiferon TB Gold Plus...[[id number]]                      In process                   Please view results for these tests on the individual orders.                   .  ..   none

## 2022-08-25 NOTE — LETTER
August 29, 2022      Krystian Laura Clemons S [facility]  [address]  [place] [address]        Dear MsRafi,    We are writing to inform you of your test results.  You do not have any signs or symptoms of TB. Your Quantiferon Gold was negative.    Quantiferon TB Gold Plus Grey Tube   Result Value Ref Range    Quantiferon Nil Tube 0.04 IU/mL   Quantiferon TB Gold Plus Green Tube   Result Value Ref Range    Quantiferon TB1 Tube 0.03 IU/mL   Quantiferon TB Gold Plus Yellow Tube   Result Value Ref Range    Quantiferon TB2 Tube 0.04    Quantiferon TB Gold Plus Purple Tube   Result Value Ref Range    Quantiferon Mitogen 6.30 IU/mL   Quantiferon TB Gold Plus   Result Value Ref Range    Quantiferon-TB Gold Plus Negative Negative      Comment:    TB1 Ag minus Nil Value -0.01 IU/mL    TB2 Ag minus Nil Value 0.00 IU/mL    Mitogen minus Nil Result 6.26 IU/mL    Nil Result 0.04 IU/mL       If you have any questions or concerns, please call the clinic at the number listed above.       Sincerely,      Electronically signed by Maria Del Rosario Farley MD on 08/29/22 at 1:20 PM
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
## 2022-08-25 NOTE — LETTER
[facility]  Patient Centered Plan of Care  About Me:        Patient Name:  Ninoska Clemons    YOB: 1990  Age:         32 year old   Tamara MRN:    [id number] Telephone Information:  Home Phone [phone]   Mobile [phone]       Address:  [address]  [place] [address] Email address:  No e-mail address on record      Emergency Contact(s)    Name Relationship Lgl Grd Work Phone Home Phone Mobile Phone   1RYLIE JONES Mother    [phone]           Primary language:  Stephanie     needed? Yes   [facility] [employer]:  [phone] op. 1  Other communication barriers:Language barrier; Lack of coping    Preferred Method of Communication:     Current living arrangement: I live in a private home with family    Mobility Status/ Medical Equipment: Independent        Health Maintenance  Health Maintenance Reviewed: Due/Overdue n/a      My Access Plan  Medical Emergency 911   Primary Clinic Line [facility] [phone]   24 Hour Appointment Line [phone] or  [phone]) (toll-free)   24 Hour Nurse Line [phone] (toll-free)   Preferred Urgent Care [facility] [phone]     [facility]  [phone]     Preferred Pharmacy [facility] - [place], [place] - [address] Behavioral Health Crisis Line The National Suicide Prevention Lifeline at 1-470.925.2658 or Text/Call 078             My Care Team Members  Patient Care Team       Relationship Specialty Notifications Start End    Dora Walker MD PCP - General   11/6/15     Phone: [phone] Fax: [phone]         [facility] [address] [facility] [address] [place] [address] [address]    Nae Miranda Community Health Worker Primary Care - [facility] Admissions 8/26/22     Pita Jimenez, MEGHANN Lead Care Coordinator Primary Care - [facility] Admissions 8/26/22     Maria Del Rosario Farley MD Assigned PCP   8/27/22     Phone: [phone] Fax:  [phone]         [address] [place] [address] [address] [place] [address]            My Care Plans  Self Management and Treatment Plan  Care Plan  [facility]     [facility]     Goal: I will [facility] in the next 30 days.     Start Date: 2022 Expected End Date: 2022    This Visit's Progress: 20%    Priority: High    Note:       Goal Statement: I will [facility] in the next 30 days.     Date goal set: 2022  Barriers: language barrier  Strengths: motivated to attend PCP appt  Date to Achieve By: 2022  Patient expressed understanding of goal: Yes    Action steps to achieve this goal:  1. I will answer my phone when I am contacted to schedule my appointment.  2. I will attend [facility] on 20[facility] at 12:45pm.   3. I will schedule a follow up appointment with the provider if it is recommended to do so while I am at the clinic.  4. I will follow up with [facility] regarding this goal at each outreach until it is completed.                              Action Plans on File:                       Advance Care Plans/Directives Type:   No data recorded    My Medical and Care Information  Problem List   Patient Active Problem List   Diagnosis     ASCUS of cervix with negative high risk HPV     Supervision of high-risk pregnancy     Insufficient prenatal care in third trimester     History of  delivery, currently pregnant     Unplanned unwanted pregnancy     Overweight (BMI 25.0-29.9)     Microcytic anemia     Pregnancy      delivery delivered     Beta thalassemia trait      Current Medications and Allergies:  See printed Medication Report.    Care Coordination Start Date: 2022   Frequency of Care Coordination: 6 weeks     Form Last Updated: 2022

## 2022-08-27 LAB
GAMMA INTERFERON BACKGROUND BLD IA-ACNC: 0.04 IU/ML
M TB IFN-G BLD-IMP: NEGATIVE
M TB IFN-G CD4+ BCKGRND COR BLD-ACNC: 6.26 IU/ML
MITOGEN IGNF BCKGRD COR BLD-ACNC: -0.01 IU/ML
MITOGEN IGNF BCKGRD COR BLD-ACNC: 0 IU/ML
QUANTIFERON MITOGEN: 6.3 IU/ML
QUANTIFERON NIL TUBE: 0.04 IU/ML
QUANTIFERON TB1 TUBE: 0.03 IU/ML
QUANTIFERON TB2 TUBE: 0.04

## 2022-09-06 ENCOUNTER — PATIENT OUTREACH (OUTPATIENT)
Dept: CARE COORDINATION | Facility: CLINIC | Age: 32
End: 2022-09-06

## 2022-09-06 ASSESSMENT — ACTIVITIES OF DAILY LIVING (ADL): DEPENDENT_IADLS:: INDEPENDENT

## 2022-09-06 NOTE — PROGRESS NOTES
Clinic Care Coordination Contact  CCRN tried calling patient x3 today to remind [facility] but unable to reach patient. Unable to leave a message either. [facility]. CCRN gave patient a print out of her appt information to her last week. Patient is scheduled to follow-up with Dr Farley on 9/14/2022. PABLON then plans to follow up with patient same day she comes to see PCP.

## 2022-09-14 ENCOUNTER — OFFICE VISIT (OUTPATIENT)
Dept: FAMILY MEDICINE | Facility: CLINIC | Age: 32
End: 2022-09-14
Payer: COMMERCIAL

## 2022-09-14 VITALS
RESPIRATION RATE: 20 BRPM | SYSTOLIC BLOOD PRESSURE: 100 MMHG | DIASTOLIC BLOOD PRESSURE: 70 MMHG | HEART RATE: 78 BPM | WEIGHT: 138.75 LBS | HEIGHT: 59 IN | TEMPERATURE: 98.3 F | BODY MASS INDEX: 27.97 KG/M2 | OXYGEN SATURATION: 99 %

## 2022-09-14 DIAGNOSIS — Z97.5 NEXPLANON IN PLACE: ICD-10-CM

## 2022-09-14 DIAGNOSIS — Z76.89 ENCOUNTER TO ESTABLISH CARE: Primary | ICD-10-CM

## 2022-09-14 DIAGNOSIS — Z64.0 UNPLANNED UNWANTED PREGNANCY: ICD-10-CM

## 2022-09-14 PROBLEM — O09.33 INSUFFICIENT PRENATAL CARE IN THIRD TRIMESTER: Status: RESOLVED | Noted: 2022-01-25 | Resolved: 2022-09-14

## 2022-09-14 PROBLEM — O09.90 SUPERVISION OF HIGH-RISK PREGNANCY: Status: RESOLVED | Noted: 2022-01-25 | Resolved: 2022-09-14

## 2022-09-14 PROBLEM — Z34.90 PREGNANCY: Status: RESOLVED | Noted: 2022-01-25 | Resolved: 2022-09-14

## 2022-09-14 PROCEDURE — 99214 OFFICE O/P EST MOD 30 MIN: CPT | Performed by: FAMILY MEDICINE

## 2022-09-14 NOTE — PROGRESS NOTES
Assessment & Plan     Encounter to establish care  PCP no longer at this clinic.   Chart reviewed.   Declined pcv20, flu shot, covid booster.     Unplanned unwanted pregnancy  Nexplanon in place  S/p elective medical , no complications.   Nexplanon placed in 2022.        Return in about 6 months (around 3/14/2023) for Routine preventive, with me.    40 minutes spent on the date of the encounter doing chart review, history and exam, documentation and further activities per the note    Visit was completed along with Stephanie phone     Options for treatment and follow-up care were reviewed with the patient. Aeelijah Clemons and/or guardian was engaged and actively involved in the decision making process. Aeelijah Clemons and/or guardian verbalized understanding of the options discussed and was satisfied with the final plan.    Maria Del Rosario Farley MD  [facility]    Subjective   Krystianh Laura Clemons is a 32 year old, presenting for the following health issues:  est care  (Follow up with pregnancy )      History of Present Illness       Reason for visit:  Est care    She eats 2-3 servings of fruits and vegetables daily.She consumes 0 sweetened beverage(s) daily.She exercises with enough effort to increase her heart rate 9 or less minutes per day.  She exercises with enough effort to increase her heart rate 3 or less days per week.   She is taking medications regularly.     [facility]   Nexplanon placed and she says they will follow up with her again in a few weeks.     She has no other concerns.   Chart reviewed.     Denies any suicidal or homicidal ideations.   Declined sooner follow up.       Review of Systems   Constitutional, HEENT, cardiovascular, pulmonary, gi and gu systems are negative, except as otherwise noted.      Objective    /70 (BP Location: Left arm, Patient Position: Sitting, Cuff Size: Adult Regular)   Pulse 78   Temp  "98.3  F (36.8  C) (Oral)   Resp 20   Ht 1.507 m (4' 11.33\")   Wt 62.9 kg (138 lb 12 oz)   LMP 07/26/2022   SpO2 99%   BMI 27.71 kg/m    Body mass index is 27.71 kg/m .  Physical Exam   GENERAL: healthy, alert and no distress  EYES: Eyes grossly normal to inspection, PERRL and conjunctivae and sclerae normal  HENT: ear canals and TM's normal, nose and mouth without ulcers or lesions  NECK: no adenopathy, no asymmetry, masses, or scars and thyroid normal to palpation  RESP: lungs clear to auscultation - no rales, rhonchi or wheezes  CV: regular rate and rhythm, normal S1 S2, no S3 or S4, no murmur, click or rub, no peripheral edema and peripheral pulses strong  ABDOMEN: soft, nontender, no hepatosplenomegaly, no masses and bowel sounds normal  MS: no gross musculoskeletal defects noted, no edema  SKIN: no suspicious lesions or rashes  NEURO: Normal strength and tone, mentation intact and speech normal  PSYCH: mentation appears normal, affect normal/bright                    "

## 2022-09-23 ENCOUNTER — PATIENT OUTREACH (OUTPATIENT)
Dept: CARE COORDINATION | Facility: CLINIC | Age: 32
End: 2022-09-23

## 2022-09-23 ASSESSMENT — ACTIVITIES OF DAILY LIVING (ADL): DEPENDENT_IADLS:: INDEPENDENT

## 2022-09-23 NOTE — PROGRESS NOTES
Clinic Care Coordination Contact    Community Health Worker Follow Up  Spoke with patient through [place] [id number]    Care Gaps:     Health Maintenance Due   Topic Date Due     ADVANCE CARE PLANNING  Never done     Pneumococcal Vaccine: Pediatrics (0 to 5 Years) and At-Risk Patients (6 to 64 Years) (1 - PCV) Never done     PREVENTIVE CARE VISIT  12/16/2015     COVID-19 Vaccine (3 - Booster for Pfizer series) 11/27/2021     Scheduled 3/15/23 at 11:40AM with Dr. Farley for preventive care visit.      Care Plan:   [facility]     Problem[facility]     Goal: I will attend my [facility] in the next 30 days.  Completed 9/23/2022    Start Date: 8/25/2022 Expected End Date: 9/25/2022    Recent Progress: 20%    Priority: High    Note:       Goal Statement: I will attend my [facility] in the next 30 days.     Date goal set: 8/25/2022  Barriers: language barrier  Strengths: motivated to attend PCP appt  Date to Achieve By: 9/25/2022  Patient expressed understanding of goal: Yes    Action steps to achieve this goal:  1. I will answer my phone when I am contacted to schedule my appointment.  2. I will [facility] on [facility]22 at 12:45pm.   3. I will schedule a follow up appointment with the provider if it is recommended to do so while I am at the clinic.  4. I will follow up with [facility] regarding this goal at each outreach until it is completed.                         Intervention and Education during outreach:      Patient confirmed that she completed 9/[facility] and saw Dr. Farley for follow up on 9/14. CHW inquired about how patient is doing and if she needs any additional support or resources. Patient declined needing any additional support. Patient wrote down CHW's contact and will reach out for support as needed. CHW review graduation status.     CHW Plan: CHW will do no further  outreaches at this time. Per consult with PABLON, kim to graduate from [employer], status updated.     Nae [facility] Care Coordination  [facility]    Phone: [phone]

## (undated) DEVICE — GLOVE UNDER INDICATOR PI SZ 6 LF 41660

## (undated) DEVICE — PACK MAJOR BASIN 673

## (undated) DEVICE — BLADE CLIPPER PIVOT PURPLE DISP 9660

## (undated) DEVICE — SUTURE PDS 0 27IN CT1 + VIOLET PDP340H

## (undated) DEVICE — GLOVE BIOGEL PI ULTRATOUCH G SZ 6.0 42160

## (undated) DEVICE — SOL ADH LIQUID BENZOIN SWAB 0.6ML C1544

## (undated) DEVICE — DRSG STERI STRIP 1/2X4" R1547

## (undated) DEVICE — CUSTOM CATH LAB BASIN SET PACK 640PACK LHE SUTCNBPFCA

## (undated) DEVICE — SURGICEL POWDER ABSORBABLE HEMOSTAT 3GM 3013SP

## (undated) DEVICE — TUBE BLOOD LAV 4.0ML EDTA PLSTC

## (undated) DEVICE — DRSG STERI STRIP 1/4X3" R1541

## (undated) DEVICE — PLATE GROUNDING ADULT W/CORD 9165L

## (undated) DEVICE — SOL WATER IRRIG 1000ML BOTTLE 2F7114

## (undated) DEVICE — SUTURE MONOCRYL+ 4-0 PS-2 27IN MCP426H

## (undated) DEVICE — SU CHROMIC 1 CT-1 27" 813H

## (undated) DEVICE — SOL NACL 0.9% IRRIG 1000ML BOTTLE 2F7124

## (undated) DEVICE — SUCTION KIWI VAC  VAC-6000M

## (undated) DEVICE — SU CHROMIC 1 CT 27" 803H

## (undated) DEVICE — GLOVE SURG PI ULTRA TOUCH M SZ 6-1/2 LF

## (undated) DEVICE — CUSTOM PACK C-SECTION LHE

## (undated) DEVICE — SUTURE VICRYL+ 3-0 27IN CT-1 UND VCP258H

## (undated) DEVICE — DRESSING MEPILEX BORDER POST-OP 4X8

## (undated) DEVICE — WRAPPER STERILE QC 200 18X18

## (undated) DEVICE — GLOVE SURG PI ULTRA TOUCH M SZ 7 LF 42670

## (undated) DEVICE — DRAPE STERI CESAREAN W/POUCH 7966

## (undated) DEVICE — GOWN IMPERVIOUS BREATHABLE SMART LG 89015

## (undated) DEVICE — SUTURE VICRYL+ 2-0 27IN CT-1 UND VCP259H

## (undated) DEVICE — CATH SUCTION 10FR W/TRAP DYND44110

## (undated) DEVICE — SUCTION MANIFOLD NEPTUNE 2 SYS 1 PORT 702-025-000

## (undated) DEVICE — PREP CHLORAPREP 26ML TINTED HI-LITE ORANGE 930815

## (undated) DEVICE — SU PLAIN 3-0 XLH  27" 52T

## (undated) DEVICE — DRESSING MEPILEX BORDER POST-OP 4X10